# Patient Record
Sex: MALE | Race: BLACK OR AFRICAN AMERICAN | Employment: OTHER | URBAN - METROPOLITAN AREA
[De-identification: names, ages, dates, MRNs, and addresses within clinical notes are randomized per-mention and may not be internally consistent; named-entity substitution may affect disease eponyms.]

---

## 2019-02-14 ENCOUNTER — HOSPITAL ENCOUNTER (INPATIENT)
Age: 69
LOS: 5 days | Discharge: HOME HEALTH | DRG: 023 | End: 2019-02-19
Attending: NEUROLOGICAL SURGERY | Admitting: NEUROLOGICAL SURGERY
Payer: MEDICARE

## 2019-02-14 ENCOUNTER — APPOINTMENT (OUTPATIENT)
Dept: INTERVENTIONAL RADIOLOGY/VASCULAR | Age: 69
DRG: 023 | End: 2019-02-14
Attending: NURSE PRACTITIONER
Payer: MEDICARE

## 2019-02-14 ENCOUNTER — APPOINTMENT (OUTPATIENT)
Dept: CT IMAGING | Age: 69
DRG: 023 | End: 2019-02-14
Attending: NURSE PRACTITIONER
Payer: MEDICARE

## 2019-02-14 DIAGNOSIS — E11.9 CONTROLLED TYPE 2 DIABETES MELLITUS WITHOUT COMPLICATION, WITH LONG-TERM CURRENT USE OF INSULIN (HCC): Chronic | ICD-10-CM

## 2019-02-14 DIAGNOSIS — I63.511 CEREBRAL INFARCTION DUE TO OCCLUSION OF RIGHT MIDDLE CEREBRAL ARTERY (HCC): ICD-10-CM

## 2019-02-14 DIAGNOSIS — I63.511 ACUTE RIGHT ARTERIAL ISCHEMIC STROKE, MCA (MIDDLE CEREBRAL ARTERY) (HCC): ICD-10-CM

## 2019-02-14 DIAGNOSIS — I48.0 PAROXYSMAL ATRIAL FIBRILLATION (HCC): ICD-10-CM

## 2019-02-14 DIAGNOSIS — I10 ESSENTIAL HYPERTENSION: ICD-10-CM

## 2019-02-14 DIAGNOSIS — Z79.4 CONTROLLED TYPE 2 DIABETES MELLITUS WITHOUT COMPLICATION, WITH LONG-TERM CURRENT USE OF INSULIN (HCC): Chronic | ICD-10-CM

## 2019-02-14 DIAGNOSIS — I63.9 ISCHEMIC STROKE (HCC): Primary | ICD-10-CM

## 2019-02-14 DIAGNOSIS — I61.0 NONTRAUMATIC SUBCORTICAL HEMORRHAGE OF RIGHT CEREBRAL HEMISPHERE (HCC): ICD-10-CM

## 2019-02-14 PROCEDURE — 70496 CT ANGIOGRAPHY HEAD: CPT

## 2019-02-14 PROCEDURE — C1760 CLOSURE DEV, VASC: HCPCS

## 2019-02-14 PROCEDURE — C1894 INTRO/SHEATH, NON-LASER: HCPCS

## 2019-02-14 PROCEDURE — C1769 GUIDE WIRE: HCPCS

## 2019-02-14 PROCEDURE — 74011250636 HC RX REV CODE- 250/636: Performed by: NEUROLOGICAL SURGERY

## 2019-02-14 PROCEDURE — 77030019605

## 2019-02-14 PROCEDURE — 74011250637 HC RX REV CODE- 250/637: Performed by: NURSE PRACTITIONER

## 2019-02-14 PROCEDURE — B40G1ZZ PLAIN RADIOGRAPHY OF LEFT LOWER EXTREMITY ARTERIES USING LOW OSMOLAR CONTRAST: ICD-10-PCS | Performed by: NEUROLOGICAL SURGERY

## 2019-02-14 PROCEDURE — 61645 PERQ ART M-THROMBECT &/NFS: CPT | Performed by: NEUROLOGICAL SURGERY

## 2019-02-14 PROCEDURE — 65610000001 HC ROOM ICU GENERAL

## 2019-02-14 PROCEDURE — 74011250636 HC RX REV CODE- 250/636

## 2019-02-14 PROCEDURE — 70450 CT HEAD/BRAIN W/O DYE: CPT

## 2019-02-14 PROCEDURE — 74011250636 HC RX REV CODE- 250/636: Performed by: NURSE PRACTITIONER

## 2019-02-14 PROCEDURE — 77030038864 HC CATH NEURO AXS CAT 5 STRY -H1

## 2019-02-14 PROCEDURE — L1830 KO IMMOB CANVAS LONG PRE OTS: HCPCS

## 2019-02-14 PROCEDURE — 77030005518 HC CATH URETH FOL 2W BARD -B

## 2019-02-14 PROCEDURE — 0042T CT PERF W CBF: CPT

## 2019-02-14 PROCEDURE — 77030032490 HC SLV COMPR SCD KNE COVD -B

## 2019-02-14 PROCEDURE — 99153 MOD SED SAME PHYS/QHP EA: CPT

## 2019-02-14 PROCEDURE — B30R1ZZ PLAIN RADIOGRAPHY OF INTRACRANIAL ARTERIES USING LOW OSMOLAR CONTRAST: ICD-10-PCS | Performed by: NEUROLOGICAL SURGERY

## 2019-02-14 PROCEDURE — 03CG3ZZ EXTIRPATION OF MATTER FROM INTRACRANIAL ARTERY, PERCUTANEOUS APPROACH: ICD-10-PCS | Performed by: NEUROLOGICAL SURGERY

## 2019-02-14 PROCEDURE — 74011000250 HC RX REV CODE- 250: Performed by: NEUROLOGICAL SURGERY

## 2019-02-14 PROCEDURE — 93005 ELECTROCARDIOGRAM TRACING: CPT | Performed by: NURSE PRACTITIONER

## 2019-02-14 PROCEDURE — 74011636320 HC RX REV CODE- 636/320: Performed by: NEUROLOGICAL SURGERY

## 2019-02-14 PROCEDURE — B3060ZZ PLAIN RADIOGRAPHY OF RIGHT INTERNAL CAROTID ARTERY USING HIGH OSMOLAR CONTRAST: ICD-10-PCS | Performed by: NEUROLOGICAL SURGERY

## 2019-02-14 PROCEDURE — 77030004635 HC CATH ANGI TORC COOK -B

## 2019-02-14 PROCEDURE — B3031ZZ PLAIN RADIOGRAPHY OF RIGHT COMMON CAROTID ARTERY USING LOW OSMOLAR CONTRAST: ICD-10-PCS | Performed by: NEUROLOGICAL SURGERY

## 2019-02-14 PROCEDURE — 74011000258 HC RX REV CODE- 258: Performed by: NEUROLOGICAL SURGERY

## 2019-02-14 PROCEDURE — 36217 PLACE CATHETER IN ARTERY: CPT | Performed by: NEUROLOGICAL SURGERY

## 2019-02-14 PROCEDURE — 99152 MOD SED SAME PHYS/QHP 5/>YRS: CPT

## 2019-02-14 PROCEDURE — C1887 CATHETER, GUIDING: HCPCS

## 2019-02-14 PROCEDURE — 75810000275 HC EMERGENCY DEPT VISIT NO LEVEL OF CARE: Performed by: NEUROLOGICAL SURGERY

## 2019-02-14 PROCEDURE — C1757 CATH, THROMBECTOMY/EMBOLECT: HCPCS

## 2019-02-14 PROCEDURE — 76937 US GUIDE VASCULAR ACCESS: CPT

## 2019-02-14 RX ORDER — SODIUM CHLORIDE 0.9 % (FLUSH) 0.9 %
10 SYRINGE (ML) INJECTION
Status: COMPLETED | OUTPATIENT
Start: 2019-02-14 | End: 2019-02-14

## 2019-02-14 RX ORDER — SODIUM CHLORIDE 9 MG/ML
25 INJECTION, SOLUTION INTRAVENOUS ONCE
Status: COMPLETED | OUTPATIENT
Start: 2019-02-14 | End: 2019-02-14

## 2019-02-14 RX ORDER — ONDANSETRON 2 MG/ML
8 INJECTION INTRAMUSCULAR; INTRAVENOUS AS NEEDED
Status: DISCONTINUED | OUTPATIENT
Start: 2019-02-14 | End: 2019-02-14

## 2019-02-14 RX ORDER — HYDROCODONE BITARTRATE AND ACETAMINOPHEN 7.5; 325 MG/1; MG/1
1 TABLET ORAL
Status: DISCONTINUED | OUTPATIENT
Start: 2019-02-14 | End: 2019-02-19 | Stop reason: HOSPADM

## 2019-02-14 RX ORDER — ROSUVASTATIN CALCIUM 20 MG/1
40 TABLET, COATED ORAL
Status: DISCONTINUED | OUTPATIENT
Start: 2019-02-14 | End: 2019-02-19 | Stop reason: HOSPADM

## 2019-02-14 RX ORDER — SODIUM CHLORIDE 9 MG/ML
75 INJECTION, SOLUTION INTRAVENOUS CONTINUOUS
Status: DISCONTINUED | OUTPATIENT
Start: 2019-02-14 | End: 2019-02-15

## 2019-02-14 RX ORDER — BISACODYL 5 MG
5 TABLET, DELAYED RELEASE (ENTERIC COATED) ORAL DAILY PRN
Status: DISCONTINUED | OUTPATIENT
Start: 2019-02-14 | End: 2019-02-19 | Stop reason: HOSPADM

## 2019-02-14 RX ORDER — INSULIN LISPRO 100 [IU]/ML
INJECTION, SOLUTION INTRAVENOUS; SUBCUTANEOUS EVERY 6 HOURS
Status: DISCONTINUED | OUTPATIENT
Start: 2019-02-15 | End: 2019-02-16

## 2019-02-14 RX ORDER — FENTANYL CITRATE 50 UG/ML
25-100 INJECTION, SOLUTION INTRAMUSCULAR; INTRAVENOUS
Status: DISCONTINUED | OUTPATIENT
Start: 2019-02-14 | End: 2019-02-14

## 2019-02-14 RX ORDER — HEPARIN SODIUM 200 [USP'U]/100ML
1000 INJECTION, SOLUTION INTRAVENOUS
Status: DISCONTINUED | OUTPATIENT
Start: 2019-02-14 | End: 2019-02-18 | Stop reason: HOSPADM

## 2019-02-14 RX ORDER — MIDAZOLAM HYDROCHLORIDE 1 MG/ML
.25-2 INJECTION, SOLUTION INTRAMUSCULAR; INTRAVENOUS
Status: DISCONTINUED | OUTPATIENT
Start: 2019-02-14 | End: 2019-02-14

## 2019-02-14 RX ORDER — ONDANSETRON 2 MG/ML
4 INJECTION INTRAMUSCULAR; INTRAVENOUS
Status: DISCONTINUED | OUTPATIENT
Start: 2019-02-14 | End: 2019-02-19 | Stop reason: HOSPADM

## 2019-02-14 RX ORDER — ACETAMINOPHEN 650 MG/1
650 SUPPOSITORY RECTAL
Status: DISCONTINUED | OUTPATIENT
Start: 2019-02-14 | End: 2019-02-14

## 2019-02-14 RX ORDER — NICARDIPINE HYDROCHLORIDE 0.1 MG/ML
0-15 INJECTION INTRAVENOUS ONCE
Status: COMPLETED | OUTPATIENT
Start: 2019-02-14 | End: 2019-02-14

## 2019-02-14 RX ORDER — SERTRALINE HYDROCHLORIDE 100 MG/1
100 TABLET, FILM COATED ORAL EVERY EVENING
Status: DISCONTINUED | OUTPATIENT
Start: 2019-02-15 | End: 2019-02-19 | Stop reason: HOSPADM

## 2019-02-14 RX ORDER — IODIXANOL 320 MG/ML
5-300 INJECTION, SOLUTION INTRAVASCULAR
Status: COMPLETED | OUTPATIENT
Start: 2019-02-14 | End: 2019-02-14

## 2019-02-14 RX ORDER — DOCUSATE SODIUM 100 MG/1
100 CAPSULE, LIQUID FILLED ORAL 2 TIMES DAILY
Status: DISCONTINUED | OUTPATIENT
Start: 2019-02-14 | End: 2019-02-19 | Stop reason: HOSPADM

## 2019-02-14 RX ORDER — LISINOPRIL AND HYDROCHLOROTHIAZIDE 12.5; 2 MG/1; MG/1
1 TABLET ORAL DAILY
Status: DISCONTINUED | OUTPATIENT
Start: 2019-02-15 | End: 2019-02-19 | Stop reason: HOSPADM

## 2019-02-14 RX ORDER — ONDANSETRON 2 MG/ML
1 INJECTION INTRAMUSCULAR; INTRAVENOUS
Status: DISCONTINUED | OUTPATIENT
Start: 2019-02-14 | End: 2019-02-14 | Stop reason: SDUPTHER

## 2019-02-14 RX ADMIN — HEPARIN SODIUM 2000 UNITS: 200 INJECTION, SOLUTION INTRAVENOUS at 20:45

## 2019-02-14 RX ADMIN — HEPARIN SODIUM 2000 UNITS: 200 INJECTION, SOLUTION INTRAVENOUS at 20:54

## 2019-02-14 RX ADMIN — Medication 10 ML: at 20:06

## 2019-02-14 RX ADMIN — SODIUM CHLORIDE 75 ML/HR: 900 INJECTION, SOLUTION INTRAVENOUS at 21:56

## 2019-02-14 RX ADMIN — HYDROCODONE BITARTRATE AND ACETAMINOPHEN 1 TABLET: 7.5; 325 TABLET ORAL at 23:11

## 2019-02-14 RX ADMIN — MIDAZOLAM HYDROCHLORIDE 2 MG: 1 INJECTION, SOLUTION INTRAMUSCULAR; INTRAVENOUS at 21:01

## 2019-02-14 RX ADMIN — HEPARIN SODIUM 2000 UNITS: 200 INJECTION, SOLUTION INTRAVENOUS at 20:48

## 2019-02-14 RX ADMIN — FENTANYL CITRATE 25 MCG: 50 INJECTION, SOLUTION INTRAMUSCULAR; INTRAVENOUS at 20:58

## 2019-02-14 RX ADMIN — SODIUM CHLORIDE 100 ML: 900 INJECTION, SOLUTION INTRAVENOUS at 20:06

## 2019-02-14 RX ADMIN — ROSUVASTATIN CALCIUM 40 MG: 20 TABLET, FILM COATED ORAL at 23:11

## 2019-02-14 RX ADMIN — IODIXANOL 100 ML: 320 INJECTION, SOLUTION INTRAVASCULAR at 21:13

## 2019-02-14 RX ADMIN — IOPAMIDOL 125 ML: 755 INJECTION, SOLUTION INTRAVENOUS at 20:06

## 2019-02-14 RX ADMIN — HEPARIN SODIUM 2000 UNITS: 200 INJECTION, SOLUTION INTRAVENOUS at 20:53

## 2019-02-14 RX ADMIN — HEPARIN SODIUM 2000 UNITS: 200 INJECTION, SOLUTION INTRAVENOUS at 20:55

## 2019-02-14 RX ADMIN — DOCUSATE SODIUM 100 MG: 100 CAPSULE, LIQUID FILLED ORAL at 23:11

## 2019-02-14 RX ADMIN — NICARDIPINE HYDROCHLORIDE 5 MG/HR: 0.1 INJECTION, SOLUTION INTRAVENOUS at 21:56

## 2019-02-14 RX ADMIN — SODIUM CHLORIDE 25 ML/HR: 900 INJECTION, SOLUTION INTRAVENOUS at 20:30

## 2019-02-14 RX ADMIN — ONDANSETRON HYDROCHLORIDE 8 MG: 2 INJECTION, SOLUTION INTRAVENOUS at 21:00

## 2019-02-15 ENCOUNTER — APPOINTMENT (OUTPATIENT)
Dept: CT IMAGING | Age: 69
DRG: 023 | End: 2019-02-15
Attending: NURSE PRACTITIONER
Payer: MEDICARE

## 2019-02-15 PROBLEM — I48.0 PAROXYSMAL ATRIAL FIBRILLATION (HCC): Status: ACTIVE | Noted: 2019-02-15

## 2019-02-15 PROBLEM — I61.0 NONTRAUMATIC SUBCORTICAL HEMORRHAGE OF RIGHT CEREBRAL HEMISPHERE (HCC): Status: ACTIVE | Noted: 2019-02-15

## 2019-02-15 LAB
ABO + RH BLD: NORMAL
AMPHET UR QL SCN: NEGATIVE
ANION GAP SERPL CALC-SCNC: 6 MMOL/L (ref 7–16)
APTT PPP: 26.9 SEC (ref 24.7–39.8)
APTT PPP: 26.9 SEC (ref 24.7–39.8)
ATRIAL RATE: 340 BPM
ATRIAL RATE: 394 BPM
BARBITURATES UR QL SCN: NEGATIVE
BENZODIAZ UR QL: POSITIVE
BLOOD GROUP ANTIBODIES SERPL: NORMAL
BUN SERPL-MCNC: 11 MG/DL (ref 8–23)
CALCIUM SERPL-MCNC: 8.2 MG/DL (ref 8.3–10.4)
CALCULATED R AXIS, ECG10: 38 DEGREES
CALCULATED R AXIS, ECG10: 52 DEGREES
CALCULATED T AXIS, ECG11: -150 DEGREES
CALCULATED T AXIS, ECG11: 95 DEGREES
CANNABINOIDS UR QL SCN: NEGATIVE
CHLORIDE SERPL-SCNC: 109 MMOL/L (ref 98–107)
CHOLEST SERPL-MCNC: 223 MG/DL
CK SERPL-CCNC: 98 U/L (ref 21–215)
CO2 SERPL-SCNC: 26 MMOL/L (ref 21–32)
COCAINE UR QL SCN: NEGATIVE
CREAT SERPL-MCNC: 0.75 MG/DL (ref 0.8–1.5)
DIAGNOSIS, 93000: NORMAL
DIAGNOSIS, 93000: NORMAL
ERYTHROCYTE [DISTWIDTH] IN BLOOD BY AUTOMATED COUNT: 14.5 % (ref 11.9–14.6)
ERYTHROCYTE [DISTWIDTH] IN BLOOD BY AUTOMATED COUNT: 14.6 % (ref 11.9–14.6)
EST. AVERAGE GLUCOSE BLD GHB EST-MCNC: 137 MG/DL
GLUCOSE BLD STRIP.AUTO-MCNC: 105 MG/DL (ref 65–100)
GLUCOSE BLD STRIP.AUTO-MCNC: 108 MG/DL (ref 65–100)
GLUCOSE BLD STRIP.AUTO-MCNC: 120 MG/DL (ref 65–100)
GLUCOSE BLD STRIP.AUTO-MCNC: 121 MG/DL (ref 65–100)
GLUCOSE BLD STRIP.AUTO-MCNC: 140 MG/DL (ref 65–100)
GLUCOSE SERPL-MCNC: 118 MG/DL (ref 65–100)
HBA1C MFR BLD: 6.4 % (ref 4.8–6)
HCT VFR BLD AUTO: 46.1 % (ref 41.1–50.3)
HCT VFR BLD AUTO: 47.8 % (ref 41.1–50.3)
HDLC SERPL-MCNC: 55 MG/DL (ref 40–60)
HDLC SERPL: 4.1 {RATIO}
HGB BLD-MCNC: 14.6 G/DL (ref 13.6–17.2)
HGB BLD-MCNC: 15.1 G/DL (ref 13.6–17.2)
INR PPP: 1
LDLC SERPL CALC-MCNC: 152.4 MG/DL
LIPID PROFILE,FLP: ABNORMAL
MAGNESIUM SERPL-MCNC: 2 MG/DL (ref 1.8–2.4)
MAGNESIUM SERPL-MCNC: 2.1 MG/DL (ref 1.8–2.4)
MCH RBC QN AUTO: 26.8 PG (ref 26.1–32.9)
MCH RBC QN AUTO: 27 PG (ref 26.1–32.9)
MCHC RBC AUTO-ENTMCNC: 31.6 G/DL (ref 31.4–35)
MCHC RBC AUTO-ENTMCNC: 31.7 G/DL (ref 31.4–35)
MCV RBC AUTO: 84.6 FL (ref 79.6–97.8)
MCV RBC AUTO: 85.4 FL (ref 79.6–97.8)
METHADONE UR QL: NEGATIVE
NRBC # BLD: 0 K/UL (ref 0–0.2)
NRBC # BLD: 0 K/UL (ref 0–0.2)
OPIATES UR QL: POSITIVE
PCP UR QL: NEGATIVE
PLATELET # BLD AUTO: 135 K/UL (ref 150–450)
PLATELET # BLD AUTO: 167 K/UL (ref 150–450)
PMV BLD AUTO: 10.8 FL (ref 9.4–12.3)
PMV BLD AUTO: 11.7 FL (ref 9.4–12.3)
POTASSIUM SERPL-SCNC: 3.8 MMOL/L (ref 3.5–5.1)
PROTHROMBIN TIME: 13.3 SEC (ref 11.7–14.5)
Q-T INTERVAL, ECG07: 344 MS
Q-T INTERVAL, ECG07: 410 MS
QRS DURATION, ECG06: 66 MS
QRS DURATION, ECG06: 76 MS
QTC CALCULATION (BEZET), ECG08: 427 MS
QTC CALCULATION (BEZET), ECG08: 429 MS
RBC # BLD AUTO: 5.45 M/UL (ref 4.23–5.6)
RBC # BLD AUTO: 5.6 M/UL (ref 4.23–5.6)
SODIUM SERPL-SCNC: 141 MMOL/L (ref 136–145)
SPECIMEN EXP DATE BLD: NORMAL
TRIGL SERPL-MCNC: 78 MG/DL (ref 35–150)
TROPONIN I SERPL-MCNC: 0.3 NG/ML (ref 0.02–0.05)
TROPONIN I SERPL-MCNC: 0.31 NG/ML (ref 0.02–0.05)
VENTRICULAR RATE, ECG03: 66 BPM
VENTRICULAR RATE, ECG03: 93 BPM
VLDLC SERPL CALC-MCNC: 15.6 MG/DL (ref 6–23)
WBC # BLD AUTO: 5.1 K/UL (ref 4.3–11.1)
WBC # BLD AUTO: 5.9 K/UL (ref 4.3–11.1)

## 2019-02-15 PROCEDURE — 74011250636 HC RX REV CODE- 250/636: Performed by: NEUROLOGICAL SURGERY

## 2019-02-15 PROCEDURE — 0042T CT PERF W CBF: CPT

## 2019-02-15 PROCEDURE — 65610000001 HC ROOM ICU GENERAL

## 2019-02-15 PROCEDURE — 70496 CT ANGIOGRAPHY HEAD: CPT

## 2019-02-15 PROCEDURE — 83036 HEMOGLOBIN GLYCOSYLATED A1C: CPT

## 2019-02-15 PROCEDURE — C8929 TTE W OR WO FOL WCON,DOPPLER: HCPCS

## 2019-02-15 PROCEDURE — 80307 DRUG TEST PRSMV CHEM ANLYZR: CPT

## 2019-02-15 PROCEDURE — 74011250637 HC RX REV CODE- 250/637: Performed by: NEUROLOGICAL SURGERY

## 2019-02-15 PROCEDURE — 80061 LIPID PANEL: CPT

## 2019-02-15 PROCEDURE — 93005 ELECTROCARDIOGRAM TRACING: CPT | Performed by: NURSE PRACTITIONER

## 2019-02-15 PROCEDURE — 36415 COLL VENOUS BLD VENIPUNCTURE: CPT

## 2019-02-15 PROCEDURE — 83735 ASSAY OF MAGNESIUM: CPT

## 2019-02-15 PROCEDURE — 77030020263 HC SOL INJ SOD CL0.9% LFCR 1000ML

## 2019-02-15 PROCEDURE — 86900 BLOOD TYPING SEROLOGIC ABO: CPT

## 2019-02-15 PROCEDURE — 85610 PROTHROMBIN TIME: CPT

## 2019-02-15 PROCEDURE — 74011250636 HC RX REV CODE- 250/636: Performed by: NURSE PRACTITIONER

## 2019-02-15 PROCEDURE — 82550 ASSAY OF CK (CPK): CPT

## 2019-02-15 PROCEDURE — 97161 PT EVAL LOW COMPLEX 20 MIN: CPT

## 2019-02-15 PROCEDURE — 85730 THROMBOPLASTIN TIME PARTIAL: CPT

## 2019-02-15 PROCEDURE — 97530 THERAPEUTIC ACTIVITIES: CPT

## 2019-02-15 PROCEDURE — 84484 ASSAY OF TROPONIN QUANT: CPT

## 2019-02-15 PROCEDURE — 70450 CT HEAD/BRAIN W/O DYE: CPT

## 2019-02-15 PROCEDURE — 74011250637 HC RX REV CODE- 250/637: Performed by: NURSE PRACTITIONER

## 2019-02-15 PROCEDURE — 77030021907 HC KT URIN FOL O&M -A

## 2019-02-15 PROCEDURE — 77030018798 HC PMP KT ENTRL FED COVD -A

## 2019-02-15 PROCEDURE — 97166 OT EVAL MOD COMPLEX 45 MIN: CPT

## 2019-02-15 PROCEDURE — 99221 1ST HOSP IP/OBS SF/LOW 40: CPT | Performed by: PHYSICAL MEDICINE & REHABILITATION

## 2019-02-15 PROCEDURE — 99232 SBSQ HOSP IP/OBS MODERATE 35: CPT | Performed by: NEUROLOGICAL SURGERY

## 2019-02-15 PROCEDURE — 85027 COMPLETE CBC AUTOMATED: CPT

## 2019-02-15 PROCEDURE — 74011000258 HC RX REV CODE- 258: Performed by: NEUROLOGICAL SURGERY

## 2019-02-15 PROCEDURE — 74011636320 HC RX REV CODE- 636/320: Performed by: NEUROLOGICAL SURGERY

## 2019-02-15 PROCEDURE — 92610 EVALUATE SWALLOWING FUNCTION: CPT

## 2019-02-15 PROCEDURE — 74011000250 HC RX REV CODE- 250: Performed by: NEUROLOGICAL SURGERY

## 2019-02-15 PROCEDURE — 80048 BASIC METABOLIC PNL TOTAL CA: CPT

## 2019-02-15 PROCEDURE — 82962 GLUCOSE BLOOD TEST: CPT

## 2019-02-15 RX ORDER — GUAIFENESIN 100 MG/5ML
81 LIQUID (ML) ORAL DAILY
Status: DISCONTINUED | OUTPATIENT
Start: 2019-02-15 | End: 2019-02-18

## 2019-02-15 RX ORDER — SODIUM CHLORIDE 0.9 % (FLUSH) 0.9 %
10 SYRINGE (ML) INJECTION
Status: COMPLETED | OUTPATIENT
Start: 2019-02-15 | End: 2019-02-15

## 2019-02-15 RX ORDER — ENOXAPARIN SODIUM 100 MG/ML
40 INJECTION SUBCUTANEOUS EVERY 24 HOURS
Status: DISCONTINUED | OUTPATIENT
Start: 2019-02-15 | End: 2019-02-16 | Stop reason: ALTCHOICE

## 2019-02-15 RX ADMIN — DOCUSATE SODIUM 100 MG: 100 CAPSULE, LIQUID FILLED ORAL at 18:14

## 2019-02-15 RX ADMIN — SODIUM CHLORIDE 100 ML: 900 INJECTION, SOLUTION INTRAVENOUS at 00:45

## 2019-02-15 RX ADMIN — LISINOPRIL AND HYDROCHLOROTHIAZIDE 1 TABLET: 12.5; 2 TABLET ORAL at 10:36

## 2019-02-15 RX ADMIN — ASPIRIN 81 MG 81 MG: 81 TABLET ORAL at 15:54

## 2019-02-15 RX ADMIN — Medication 10 ML: at 00:45

## 2019-02-15 RX ADMIN — SERTRALINE 100 MG: 100 TABLET, FILM COATED ORAL at 18:15

## 2019-02-15 RX ADMIN — IOPAMIDOL 125 ML: 755 INJECTION, SOLUTION INTRAVENOUS at 00:45

## 2019-02-15 RX ADMIN — PERFLUTREN 1 ML: 6.52 INJECTION, SUSPENSION INTRAVENOUS at 08:00

## 2019-02-15 RX ADMIN — SODIUM CHLORIDE 75 ML/HR: 900 INJECTION, SOLUTION INTRAVENOUS at 06:40

## 2019-02-15 RX ADMIN — ROSUVASTATIN CALCIUM 40 MG: 20 TABLET, FILM COATED ORAL at 22:12

## 2019-02-15 RX ADMIN — DOCUSATE SODIUM 100 MG: 100 CAPSULE, LIQUID FILLED ORAL at 10:37

## 2019-02-15 RX ADMIN — ENOXAPARIN SODIUM 40 MG: 40 INJECTION SUBCUTANEOUS at 15:54

## 2019-02-15 NOTE — ED NOTES
Shannan Beatty in ICU given repot from this RN. Information given was brief due to patients status. Patient was taken to CT and IR post registration in ED.

## 2019-02-15 NOTE — PROGRESS NOTES
LTG: Patient will tolerate least restrictive diet without overt signs or symptoms of airway compromise. STG: Patient will tolerate mechanical soft diet and thin liquids without overt signs or symptoms of airway compromise. STG: Patient will participate in modified barium swallow study as clinically indicated. Speech language pathology: bedside swallow note: Initial Assessment NAME/AGE/GENDER: Corinne Carson is a 76 y.o. male DATE: 2/15/2019 PRIMARY DIAGNOSIS: Acute right arterial ischemic stroke, MCA (middle cerebral artery) ([facility] [address] [I63.511] ICD-10: Treatment Diagnosis: R13.11 Oral Dysphagia. INTERDISCIPLINARY COLLABORATION: Registered Nurse and Physician PRECAUTIONS/ALLERGIES: Niacin ASSESSMENT:Based on the objective data described below, Mr. Camelia Ortiz presents with left facial droop and left inattention. Decreased sustained attention to clinician during evaluation and delayed verbal responses. Mild dysarthria noted, but patient and wife feel speech is at baseline. Patient presented with thin liquid via tsp, cup, and straw, puree, and mixed consistency. Prolonged mastication with chewable textures, which patient attributes to poor dentition/oral discomfort. No left sided pocketing observed, but increased risk due to left facial droop and decreased attention. Thin liquids tolerated without overt difficulty. Recommend mechanical soft diet/thin liquids. Medications whole in puree. Speech to follow for diet tolerance and po trials for possible advancement. Will also benefit from speech-language/cognitive assessment. Anticipated need for continued speech therapy services. Patient will benefit from skilled intervention to address the below impairments. ?????? ? ? This section established at most recent assessment?????????? 
PROBLEM LIST (Impairments causing functional limitations): 1. Oral dysphagia REHABILITATION POTENTIAL FOR STATED GOALS: Good PLAN OF CARE:  
 Patient will benefit from skilled intervention to address the following impairments. RECOMMENDATIONS AND PLANNED INTERVENTIONS (Benefits and precautions of therapy have been discussed with the patient.): 
· PO:  Mechanical soft · Liquids:  regular thin MEDICATIONS: 
· With liquid ASPIRATION PRECAUTIONS: 
· Slow rate of intake · Small bites/sips · Upright at 90 degrees during meal 
COMPENSATORY STRATEGIES/MODIFICATIONS INCLUDING: 
· Alternate liquids/solids · Small sips and bites OTHER RECOMMENDATIONS (including follow up treatment recommendations): · Family training/education · Patient education RECOMMENDED DIET MODIFICATIONS DISCUSSED WITH: 
· Medical Sub-Specialist 
· Nursing · Patient FREQUENCY/DURATION: Continue to follow patient 3 times a week for duration of hospital stay to address above goals. RECOMMENDED REHABILITATION/EQUIPMENT: (at time of discharge pending progress): Due to the probability of continued deficits (see above) this patient will likely need continued skilled speech therapy after discharge. SUBJECTIVE:  
Alert, but delayed responses. Wife at bedside. History of Present Injury/Illness: Mr. Tamy George  has no past medical history on file. Noble Jose Alfredo He also  has no past surgical history on file. Present Symptoms: oral dysphagia Pain Scale 1: Numeric (0 - 10) Pain Intensity 1: 0 Pain Location 1: Head 
Pain Intervention(s) 1: Medication (see MAR) Current Medications: No current facility-administered medications on file prior to encounter. No current outpatient medications on file prior to encounter. Current Dietary Status: NPO OBJECTIVE:  
Respiratory Status:  Nasal cannula  2 l/min Oral Motor Structure/Speech:  Oral-Motor Structure/Motor Speech Labial: Decreased rate, Left droop Dentition: Intact Lingual: Decreased rate, Decreased strength Cognitive and Communication Status: 
Neurologic State: Alert Orientation Level: Oriented X4 
 Cognition: Follows commands Perception: Cues to attend right visual field Perseveration: No perseveration noted Safety/Judgement: Fall prevention;Decreased insight into deficits BEDSIDE SWALLOW EVALUATIONOral Assessment: 
Oral Assessment Labial: Decreased rate;Left droop Dentition: Intact Lingual: Decreased rate;Decreased strength P.O. Trials: 
Patient Position: Upright in chair The patient was given tsp-small bite amounts of the following:  
Consistency Presented: Thin liquid;Puree;Mixed consistency; Solid How Presented: SLP-fed/presented;Cup/sip;Spoon;Straw;Successive swallows; Self-fed/presented ORAL PHASE: 
Bolus Acceptance: No impairment Bolus Formation/Control: Impaired Propulsion: Delayed (# of seconds) Type of Impairment: Mastication Oral Residue: None PHARYNGEAL PHASE: 
Initiation of Swallow: No impairment Laryngeal Elevation: Functional 
Aspiration Signs/Symptoms: None Vocal Quality: No impairment(Dysarthric) Effective Modifications: None Pharyngeal Phase Characteristics: No impairment, issues, or problems OTHER OBSERVATIONS: 
Rate/bite size: Questionable Endurance:  Questionable Comments: Tool Used: Dysphagia Outcome and Severity Scale (FABIAN) Score Comments Normal Diet  [] 7 With no strategies or extra time needed Functional Swallow  [] 6 May have mild oral or pharyngeal delay Mild Dysphagia [x] 5 Which may require one diet consistency restricted (those who demonstrate penetration which is entirely cleared on MBS would be included) Mild-Moderate Dysphagia  [] 4 With 1-2 diet consistencies restricted Moderate Dysphagia  [] 3 With 2 or more diet consistencies restricted Moderately Severe Dysphagia  [] 2 With partial PO strategies (trials with ST only) Severe Dysphagia  [] 1 With inability to tolerate any PO safely Score:  Initial: 5 Most Recent: X (Date: --) Interpretation of Tool: The Dysphagia Outcome and Severity Scale (FABIAN) is a simple, easy-to-use, 7-point scale developed to systematically rate the functional severity of dysphagia based on objective assessment and make recommendations for diet level, independence level, and type of nutrition. Payor: /  
 
TREATMENT:  
 (In addition to Assessment/Re-Assessment sessions the following treatments were rendered) Assessment/Reassessment only, no treatment provided today MODALITIES:  
  
  
  
  
  
  
  
  
  
  
    
  
  
  
  
  
  
  
  
   
 
ORAL MOTOR  EXERCISES: 
  
  
  
  
  
  
  
  
  
  
  
  
  
  
  
  
  
  
  
  
  
  
  
  
  
  
    
  
  
  
  
  
  
  
  
  
  
  
  
  
  
  
  
  
  
  
  
  
  
  
  
  
   
 
LARYNGEAL / PHARYNGEAL EXERCISES: 
  
  
  
  
  
  
  
  
  
  
  
  
  
  
  
  
  
  
  
  
  
    
  
  
  
  
  
  
  
  
  
  
  
  
  
  
  
  
  
  
  
   
 
__________________________________________________________________________________________________ Safety: After treatment position/precautions: · Up in chair · RN notified · Family at bedside. Progression/Medical Necessity:  
· Patient is expected to demonstrate progress in swallow strength, swallow timeliness, swallow function, diet tolerance and swallow safety to improve swallow safety, work toward diet advancement and decrease aspiration risk. Compliance with Program/Exercises: Will assess as treatment progresses Reason for Continuation of Services/Other Comments: 
· Patient continues to require skilled intervention due to oral dysphagia. Recommendations/Intent for next treatment session: \"Treatment next visit will focus on diet tolerance, po trials for potential upgrade, cognitive-linguistic assessment\". Total Treatment Duration: 
Time In: 0907 Time Out: 1016 [address] CCC-SLP 
 
 
 
 Statement Selected

## 2019-02-15 NOTE — PROGRESS NOTES
Problem: Mobility Impaired (Adult and Pediatric) Goal: *Acute Goals and Plan of Care (Insert Text) STG: 
(1.)Mr. Yoselyn Morillo will move from supine to sit and sit to supine , scoot up and down and roll side to side with STAND BY ASSIST within 3 treatment day(s). (2.)Mr. Yoselyn Morillo will transfer from bed to chair and chair to bed with STAND BY ASSIST using the least restrictive device within 3 treatment day(s). (3.)Mr. Yoselyn Morillo will ambulate with CONTACT GUARD ASSIST for 50 feet with the least restrictive device within 3 treatment day(s). (4.)Mr. Yoselyn Morillo will perform standing static and dynamic balance activities x 15 minutes with CONTACT GUARD ASSIST to improve safety within 3 day(s). (5.)Mr. Yoselyn Morillo will perform LE exercises with 1 to 2 cues for form within 3 days to improve strength for functional transfers and ambulation. LTG: 
(1.)Mr. Yoselyn Morillo will move from supine to sit and sit to supine , scoot up and down and roll side to side in bed with SUPERVISION within 7 treatment day(s). (2.)Mr. Yoselyn Morillo will transfer from bed to chair and chair to bed with STAND BY ASSIST using the least restrictive device within 7 treatment day(s). (3.)Mr. Yoselyn Morillo will ambulate with STAND BY ASSIST for 100 feet with the least restrictive device within 7 treatment day(s). (4.)Mr. Yoselyn Morillo will perform standing static and dynamic balance activities x 15 minutes with STAND BY ASSIST to improve safety within 7 day(s). ________________________________________________________________________________________________ PHYSICAL THERAPY: Initial Assessment and PM 2/15/2019INPATIENT:   
Payor: Saniya Alegria / Plan: [address] / Product Type: Managed Care Medicare /   
  
NAME/AGE/GENDER: Blayne Osborne is a 76 y.o. male PRIMARY DIAGNOSIS: Acute right arterial ischemic stroke, MCA (middle cerebral artery) ([facility] [address] [I63.511] Cerebral infarction due to occlusion of right middle cerebral artery (HCC) Cerebral infarction due to occlusion of right middle cerebral artery ([facility] [address] ICD-10: Treatment Diagnosis: · Difficulty in walking, Not elsewhere classified (R26.2) Precaution/Allergies: 
Niacin ASSESSMENT:  
Mr. Jamal Bermeo is a 76 y.o. male admitted for acute R arterial ischemic MCA CVA and s/p mechanical thrombectomy. He lives alone (or with a friend per niece). He typically ambulates and performs ADLs independently to modified independently with a cane. He is quite drowsy on contact, but agreeable to therapy. Noted decreased attention to L visual field and continued L sided weakness (grossly 3+/5). He transferred to edge of bed with minimal assistance and stood with contact guard assist. Somewhat impulsive and attempted to ambulate away from bed without instruction despite being connected to ICU monitors/catheter. Able to side step at edge of bed with CGA and rolling walker and returned to supine with CGA. Wife entered room near the end of the session, seems somewhat unaware of patient's deficits. He is well below his baseline at this time and with poor insight into his deficits. Clifton Jason is currently functioning below his baseline and would benefit from skilled PT during acute care stay to maximize safety and independence with functional mobility. This section established at most recent assessment PROBLEM LIST (Impairments causing functional limitations): 1. Decreased Strength 2. Decreased ADL/Functional Activities 3. Decreased Transfer Abilities 4. Decreased Ambulation Ability/Technique 5. Decreased Balance 6. Decreased Activity Tolerance 7. Decreased Pacing Skills 8. Increased Shortness of Breath 9. Decreased Knowledge of Precautions 10. Decreased [place] with Home Exercise Program 
11. Decreased Cognition INTERVENTIONS PLANNED: (Benefits and precautions of physical therapy have been discussed with the patient.) 1. Balance Exercise 2. Bed Mobility 3. Family Education 4. Gait Training 5. Group Therapy 6. Home Exercise Program (HEP) 7. Therapeutic Activites 8. Therapeutic Exercise/Strengthening 9. Transfer Training 10. Patient Education TREATMENT PLAN: Frequency/Duration: 3 times a week for duration of hospital stay Rehabilitation Potential For Stated Goals: Good RECOMMENDED REHABILITATION/EQUIPMENT: (at time of discharge pending progress): Due to the probability of continued deficits (see above) this patient will likely need continued skilled physical therapy after discharge. Equipment:  
? None at this time HISTORY:  
History of Present Injury/Illness (Reason for Referral): 
Clifton Jason is a 76 y.o. R hand dominant male who who originally presented to [place] [address] ED with acute onset of left hemiparesis and slurred speech witnessed by friend around 0 today. Pt complained of R sided headache prior to event. Pt on Xarelto for known LLE DVT per report. Pt with hx of of HTN, DM and depression and PCP is [facility] in [place], [place]. Pt was not candidate for tPa due to current use of Xarelto for LLE DVT. Pt had CT head at [facility], no acute infarct noted or ICH. Pt transferred to [facility] for further evaluation for Endovascular intervention. Pt had STAT CTA/CTP of head and neck upon arrival and found to have R MCA occlusion and was taken emergently for SUZIE with Dr. Caryle Bellow. Pt airway patent, he would follow commands, he had obvious left facial droop, LUE drift, and R gaze preference. NIHSS of 7. Past Medical History/Comorbidities:  
Mr. Jamal Bermeo  has no past medical history on file. Mr. Jamal Bermeo  has a past surgical history that includes ir perc art [facility] thromb infusion intracranial (2/14/2019). Social History/Living Environment:  
Home Environment: Private residence One/Two Story Residence: One story Living Alone: Yes Support Systems: Family member(s) Patient Expects to be Discharged to[de-identified] Private residence Current DME Used/Available at Home: Cane, straight Tub or Shower Type: Tub/Shower combination Prior Level of Function/Work/Activity: 
He lives alone (or with a friend per niece). He typically ambulates and performs ADLs independently to modified independently with a cane. Number of Personal Factors/Comorbidities that affect the Plan of Care: 3+: HIGH COMPLEXITY EXAMINATION:  
Most Recent Physical Functioning:  
Gross Assessment: 
AROM: Generally decreased, functional 
PROM: Within functional limits Strength: Generally decreased, functional 
Coordination: Generally decreased, functional 
Tone: Normal 
Sensation: Intact Posture: 
Posture (WDL): Exceptions to [facility] Posture Assessment: Forward head Balance: 
Sitting: Impaired Sitting - Static: Good (unsupported) Sitting - Dynamic: Fair (occasional) Standing: Impaired Standing - Static: Fair Standing - Dynamic : Fair Bed Mobility: 
Supine to Sit: Minimum assistance Sit to Supine: Contact guard assistance Scooting: Contact guard assistance Wheelchair Mobility: 
  
Transfers: 
Sit to Stand: Contact guard assistance Stand to Sit: Contact guard assistance Gait: 
  
Base of Support: Center of gravity altered; Widened Speed/Jaye: Slow;Shuffled Gait Abnormalities: Decreased step clearance; Path deviations;Trunk sway increased Distance (ft): 3 Feet (ft)(side steps at edge of bed) Assistive Device: Walker, rolling Ambulation - Level of Assistance: Contact guard assistance Interventions: Manual cues; Safety awareness training;Verbal cues Body Structures Involved: 1. Nerves 2. Muscles Body Functions Affected: 1. Sensory/Pain 2. Neuromusculoskeletal 
3. Movement Related Activities and Participation Affected: 1. Mobility 2. Self Care 3. Domestic Life 4. Interpersonal Interactions and Relationships 5. Community, Social and [place] Number of elements that affect the Plan of Care: 4+: HIGH COMPLEXITY CLINICAL PRESENTATION:  
 Presentation: Stable and uncomplicated: LOW COMPLEXITY CLINICAL DECISION MAKIN[address] [place] [address] AM-PAC 6 Clicks Basic Mobility Inpatient Short Form How much difficulty does the patient currently have. .. Unable A Lot A Little None 1. Turning over in bed (including adjusting bedclothes, sheets and blankets)? [] 1   [] 2   [x] 3   [] 4  
2. Sitting down on and standing up from a chair with arms ( e.g., wheelchair, bedside commode, etc.)   [] 1   [] 2   [x] 3   [] 4  
3. Moving from lying on back to sitting on the side of the bed? [] 1   [] 2   [x] 3   [] 4 How much help from another person does the patient currently need. .. Total A Lot A Little None 4. Moving to and from a bed to a chair (including a wheelchair)? [] 1   [] 2   [x] 3   [] 4  
5. Need to walk in hospital room? [] 1   [x] 2   [] 3   [] 4  
6. Climbing 3-5 steps with a railing? [x] 1   [] 2   [] 3   [] 4  
© , Trustees of [address] [place] [address], under license to [employer]. All rights reserved Score:  Initial: 15 Most Recent: X (Date: -- ) Interpretation of Tool:  Represents activities that are increasingly more difficult (i.e. Bed mobility, Transfers, Gait). Medical Necessity:    
· Patient demonstrates good rehab potential due to higher previous functional level. Reason for Services/Other Comments: 
· Patient continues to require modification of therapeutic interventions to increase complexity of exercises. Use of outcome tool(s) and clinical judgement create a POC that gives a: Clear prediction of patient's progress: LOW COMPLEXITY  
  
 
 
 
TREATMENT:  
(In addition to Assessment/Re-Assessment sessions the following treatments were rendered) Pre-treatment Symptoms/Complaints:  none Pain: Initial:  
Pain Intensity 1: 0  Post Session:  010 Assessment/Reassessment only, no treatment provided today Braces/Orthotics/Lines/Etc:  
· IV 
· carson catheter · ICU Monitors · O2 Device: Nasal cannula Treatment/Session Assessment:   
· Response to Treatment:  Patient tolerated well with drowsiness. · Interdisciplinary Collaboration:  
o Physical Therapist 
o Registered Nurse · After treatment position/precautions:  
o Supine in bed 
o Bed/Chair-wheels locked 
o Bed in low position 
o Call light within reach 
o RN notified 
o Family at bedside 
o Nurse at bedside · Compliance with Program/Exercises: Will assess as treatment progresses · Recommendations/Intent for next treatment session: \"Next visit will focus on advancements to more challenging activities and reduction in assistance provided\". Total Treatment Duration: PT Patient Time In/Time Out Time In: 1320 Time Out: 1341 Matthew Vanessa DPT

## 2019-02-15 NOTE — PROGRESS NOTES
Pt seen in ICU s/p admission acute right arterial ischemic stroke, MCA pt of Dr. Ronnie groves from [place]. Pt currently alert and oriented with delayed response. Wife and niece at bedside. Confirms demographics and correction of address (called to business office). There is some question about HCPOA, per staff, niece, Chi Cooney, may be HCPOA. When ask pt, who is alert and oriented, he states he wants his wife to be decision maker. Niece would need to present paperwork to be on file, pt can redo HCPOA with  if alert and oriented x4. If no paperwork, wife is legal decision maker if pt can not make decision himself. At this time, staff states, pt is decisional. Niece does have pt's wallet in room as she showed pt's insurance card. CM to follow for d/c needs of HH vs STR, IRC/9th floor. Dr. Ebony Dc following pt. Care Management Interventions PCP Verified by CM: Angelo Dupree in [facility]) Mode of Transport at Discharge: Other (see comment) Transition of Care Consult (CM Consult): Discharge Planning Discharge Durable Medical Equipment: (cane, glucometer) Current Support Network: Other(lives with friend, Jason Hamm. Wife, Hung Navarro, and niece , Jalaine Ormond -[phone]) Confirm Follow Up Transport: Self(driving self prior) Plan discussed with Pt/Family/Caregiver: Yes Freedom of Choice Offered: Yes  Resource Information Provided?: (confirms [facility]- able to get rx) Discharge Location Discharge Placement: Unable to determine at this time

## 2019-02-15 NOTE — PROGRESS NOTES
Progress Note Patient: Corinne Carson MRN: [id number]  SSN: [id number] YOB: 1950  Age: 76 y.o. Sex: male Admit Date: 2/14/2019 LOS: 1 day Subjective:  
Pt is alert and following commands this am. Pt with LUE drift, but improved, left facial palsy still present, but improved. Pt passed swallow and PT/OT/ST and rehab have seen pt this am. In nad. R groin CDI. Current Facility-Administered Medications Medication Dose Route Frequency  enoxaparin (LOVENOX) injection 40 mg  40 mg SubCUTAneous Q24H  
 aspirin chewable tablet 81 mg  81 mg Oral DAILY  tuberculin injection 5 Units  5 Units IntraDERMal ONCE  
 docusate sodium (COLACE) capsule 100 mg  100 mg Oral BID  
 bisacodyl (DULCOLAX) tablet 5 mg  5 mg Oral DAILY PRN  
 heparin (PF) 2 units/ml in NS infusion 2,000 Units  1,000 mL Irrigation Multiple  HYDROcodone-acetaminophen (NORCO) 7.5-325 mg per tablet 1 Tab  1 Tab Oral Q4H PRN  
 ondansetron (ZOFRAN) injection 4 mg  4 mg IntraVENous Q4H PRN  
 lisinopril-hydroCHLOROthiazide (PRINZIDE, ZESTORETIC) 20-12.5 mg per tablet 1 Tab  1 Tab Oral DAILY  rosuvastatin (CRESTOR) tablet 40 mg  40 mg Oral QHS  insulin lispro (HUMALOG) injection   SubCUTAneous Q6H  
 sertraline (ZOLOFT) tablet 100 mg  100 mg Oral QPM  
 
 
Objective:  
 
Vitals:  
 02/15/19 1048 02/15/19 1056 02/15/19 1100 02/15/19 1131 BP: 159/87  151/82 (!) 167/96 Pulse: 69  63 70 Resp: 18  16 18 Temp:  98.6 °F (37 °C) SpO2: 95%  96% 96% Weight:      
Height:      
  
  
Intake and Output: 
Current Shift: 02/15 0701 - 02/15 1900 In: -  
Out: 275 [Urine:275] Last 24 hr: 02/14 0701 - 02/15 0700 In: 433 [I.V.:433] Out: 8619 [GVJDK:9413] Physical Exam:  
General:  Alert, cooperative, no distress. Eyes:   PERRL, EOMs intact. , mild R gaze but crosses midline.    
Neck: Supple, symmetrical, trachea midline, no adenopathy, thyroid: no enlargment/tenderness/nodules, no carotid bruit and no JVD. Lungs:   Clear to auscultation bilaterally. Heart:  Irregular, afib. No MGR Abdomen:   Soft, non-tender. Bowel sounds normal. No masses,  No organomegaly. Extremities: Extremities normal, atraumatic, no cyanosis or edema. Pulses: 2+ and symmetric all extremities. Skin: Skin color, texture, turgor normal. No rashes or lesions Neurologic: Pt aox3, follows commands. LUE weakness compared to R, but intact. Airway patent. R gaze preference remains, but has improved. Left facial droop improving. Speech clear. Lab/Data Review: 
 
Recent Results (from the past 12 hour(s)) GLUCOSE, POC Collection Time: 02/15/19  1:34 AM  
Result Value Ref Range Glucose (POC) 105 (H) 65 - 100 mg/dL CBC W/O DIFF Collection Time: 02/15/19  4:20 AM  
Result Value Ref Range WBC 5.9 4.3 - 11.1 K/uL  
 RBC 5.45 4.23 - 5.6 M/uL  
 HGB 14.6 13.6 - 17.2 g/dL HCT 46.1 41.1 - 50.3 % MCV 84.6 79.6 - 97.8 FL  
 MCH 26.8 26.1 - 32.9 PG  
 MCHC 31.7 31.4 - 35.0 g/dL  
 RDW 14.6 11.9 - 14.6 % PLATELET 179 (L) 238 - 450 K/uL MPV 11.7 9.4 - 12.3 FL ABSOLUTE NRBC 0.00 0.0 - 0.2 K/uL PTT Collection Time: 02/15/19  4:20 AM  
Result Value Ref Range aPTT 26.9 24.7 - 39.8 SEC MAGNESIUM Collection Time: 02/15/19  4:20 AM  
Result Value Ref Range Magnesium 2.1 1.8 - 2.4 mg/dL TROPONIN I Collection Time: 02/15/19  4:20 AM  
Result Value Ref Range Troponin-I, Qt. 0.30 (HH) 0.02 - 0.05 NG/ML  
DRUG SCREEN, URINE Collection Time: 02/15/19  6:05 AM  
Result Value Ref Range PCP(PHENCYCLIDINE) NEGATIVE BENZODIAZEPINES POSITIVE    
 COCAINE NEGATIVE AMPHETAMINES NEGATIVE METHADONE NEGATIVE     
 THC (TH-CANNABINOL) NEGATIVE     
 OPIATES POSITIVE    
 BARBITURATES NEGATIVE     
GLUCOSE, POC Collection Time: 02/15/19  6:11 AM  
Result Value Ref Range Glucose (POC) 108 (H) 65 - 100 mg/dL EKG, 12 LEAD, SUBSEQUENT  
 Collection Time: 02/15/19  6:57 AM  
Result Value Ref Range Ventricular Rate 66 BPM  
 Atrial Rate 394 BPM  
 QRS Duration 66 ms  
 Q-T Interval 410 ms QTC Calculation (Bezet) 429 ms Calculated R Axis 52 degrees Calculated T Axis -150 degrees Diagnosis Atrial fibrillation ST & T wave abnormality, consider inferolateral ischemia Abnormal ECG When compared with ECG of 14-FEB-2019 22:28, 
ST now depressed in Inferior leads Inverted T waves have replaced nonspecific T wave abnormality in Inferior  
leads Confirmed by SONAL CORONADO (), Candis Oden ([id number]) on 2/15/2019 7:44:25 AM 
  
GLUCOSE, POC Collection Time: 02/15/19 11:30 AM  
Result Value Ref Range Glucose (POC) 121 (H) 65 - 100 mg/dL Assessment/ Plan:  
 
Principal Problem: 
  Cerebral infarction due to occlusion of right middle cerebral artery ([address] (2/14/2019) Active Problems: 
  Essential hypertension (2/14/2019) NEURO: Post R MCA thrombectomy 2-14-19, pt NIHSS this am 3 for gaze, lue drift, facial droop. On Crestor and ASA. CT head this am which I reviewed showed blood in the right extreme capsule/BG area and some petechial changes in the outer temporal cortex. Will hold Xaralto until this blood has cleared. PT/OT/Speech and rehab consulted. Will continue neuro checks today and repeat CT head in am due to blood. RESP: pt tolerating room air. No distress. CV: SBP goal 120-180, cardene gtt prn, 2D echo: shows EF 50-55%, hypokinesis of inferior-septal and apical walls with possible apical thrombus, discussed with Dr. Kimberly Ordoñez. Will cont monitor. Trop peaked at 0.31, trending back down. Pt denies any CP/SOB. Asa 81mg daily. Will hold Xarelto for now due to blood on CT head. HEME: HH: 14/46, Plt 135. SCDs and Lovenox for DVT prophylaxis. NEPH: Bun/Cr:11/0.7 GI:passed Swallow with ST this am, advance diet as tolerated. Pepcid. ID: afebrile, no abx. LINES: carson, IV x2. DISPO: Will send to floor in am if stable and dispo for rehab. Will monitor for any CP. LDL: 152: Pt was on crestor 40mg prior to admit. Will continue 40mg daily. Signed By: Ligia Bee NP February 15, 2019

## 2019-02-15 NOTE — PROGRESS NOTES
Upon neuro assessment, pt found to be much more drowsy than in previous assessments. Pt required copious amount of stimulation to stay awake for completion of NIH stroke scale. Norco given earlier in shift. Asked pt and wife about sensitivity to pain mediation, per pt and wife pt takes morphine at home and does not get very drowsy. [facility] in [facility] status. Per NP, draw all pending labs, and take pt to 0400 scans now.

## 2019-02-15 NOTE — PROGRESS NOTES
Physical Therapy Note: 
 
Orders received, chart reviewed. Speech therapist at bedside and inpatient rehabilitation MD about to go into room. Will attempt later as patient is able and schedule allows. Thank you, Art Salvador DPT

## 2019-02-15 NOTE — OP NOTES
Procedure: Mechanical Thrombectomy Right MCA  Surgeon: Dr. Mendez Ruiz  Assistant: Agapito Bethea NP  Pre-op Dx: Right MCA Occlusion  Post-op Dx: same  Anesthesia: Conscious sedation with fentanyl and versed  Complications: None  Findings: Occlusion of right MCA

## 2019-02-15 NOTE — CONSULTS
PM&R Rehab Consult    Subjective:     Date of Consultation:  February 15, 2019    Referring Physician: Dr. Carina Corea    Patient is a 76 y.o. male who is being seen for rehab recommendations s/p R MCA thrombectomy/stroke    Principal Problem:    Cerebral infarction due to occlusion of right middle cerebral artery ([address] (2/14/2019)    Active Problems:    Essential hypertension (2/14/2019)    HPI; Mr Nena Barrientos is a previously functionally independent 65yo AA gentleman with hx of Htn , afib, type 2 DM, LLE DVT on Xarelto, hep C and chronic back pain who presented to [place] [address] ED with an acute onset of left hemiparesis and slurred speech on the afternoon of 2/14. He had complained of a right sided headache earlier that day. Head CTshowed no acute infarct or bleed. NIHSS was 7. He was transferred to [facility] for endovascular evaluation. Pt had a STAT CTA/CTP of the head and neck and was found to have a R. MCA occlusion and was emergently taken emergently for SUZIE by Dr Carina Corea. He had noted Left facial droop, LUE drift and right gaze preference. NIHSS post proc 5-6. I am seeing him now in the ICU. He is sitting up in a chair and is AOx4. He has been hypertensive and is on a cardene gtt. Post proc Head CT shows High attenuation area in the right temporal lobe and right basal ganglia, likely contrast staining, status post right MCA thrombectomy. ST has advanced to a [facility] soft diabetic diet with thin liquids. hgbA1c is 6.4. Chol 223, trig 78, , HDL 55. Creat 0.75, mag 2.1 and Na 141  Pt reports being on chronic narcotics for back pain. He has had prior ESIs. He also gets injections to his shoulders for pain. He is fairly inactive bc of pain but is otherwise independent with gait and ADLs, and driving. Manages his own finances. No past medical history on file. No family history on file.    Social History     Tobacco Use    Smoking status: Not on file   Substance Use Topics    Alcohol use: Not on file     No past surgical history on file. Prior to Admission medications    Not on File     Allergies   Allergen Reactions    Niacin Unknown (comments)        Review of Systems:  A comprehensive review of systems was negative except for: Constitutional: positive for fatigue  Eyes: positive for visual disturbance  Cardiovascular: positive for fatigue  Gastrointestinal: positive for reflux symptoms and constipation  Hematologic/lymphatic: positive for easy bruising  Musculoskeletal: positive for stiff joints and back pain  Neurological: positive for paresthesia and gait problems  Behvioral/Psych: positive for depression    Objective:     Vitals:  Blood pressure 137/80, pulse 65, temperature 98.6 °F (37 °C), resp. rate 15, height 5' 8\" (1.727 m), weight 120 lb 13 oz (54.8 kg), SpO2 97 %. Temp (24hrs), Av °F (36.7 °C), Min:97.6 °F (36.4 °C), Max:98.6 °F (37 °C)      Intake and Output:   1901 - 02/15 0700  In: 824 [I.V.:433]  Out: 4767 [Urine:3615]    Physical Exam:  General:  Alert, oriented and mood affect blunted   Lungs:   Clear to auscultation bilaterally. Heart:  Regular rate and rhythm, S1, S2 stable, no murmur, click, rub or gallop. Abdomen:   Soft, non-tender. Bowel sounds present. No masses,  No organomegaly. Genitourinary: defer   Neuro Muscular: Speech is quiet but articulate  Follows commands , a bit delayed on the left  LUE drift; prox strength 4 on the left   near symm  Bilateral LE hip flexion weakness but DF and PF 5/5 b  No pathological reflexes  diffiuculty with proprioc testing   Skin:  No rashes, lesions, or signs/symptoms or infection.  No edema       Labs/Studies:  Recent Results (from the past 72 hour(s))   EKG, 12 LEAD, SUBSEQUENT    Collection Time: 19 10:28 PM   Result Value Ref Range    Ventricular Rate 93 BPM    Atrial Rate 340 BPM    QRS Duration 76 ms    Q-T Interval 344 ms    QTC Calculation (Bezet) 427 ms    Calculated R Axis 38 degrees    Calculated T Axis 95 degrees    Diagnosis Atrial fibrillation with premature ventricular or aberrantly conducted   complexes  Nonspecific T wave abnormality  Abnormal ECG  No previous ECGs available  Confirmed by Shira Montejo ([id number]) on 2/15/2019 7:12:00 AM     MAGNESIUM    Collection Time: 02/15/19 12:35 AM   Result Value Ref Range    Magnesium 2.0 1.8 - 2.4 mg/dL   CK    Collection Time: 02/15/19 12:35 AM   Result Value Ref Range    CK 98 21 - 215 U/L   TROPONIN I    Collection Time: 02/15/19 12:35 AM   Result Value Ref Range    Troponin-I, Qt. 0.31 (HH) 0.02 - 0.05 NG/ML   LIPID PANEL    Collection Time: 02/15/19 12:35 AM   Result Value Ref Range    LIPID PROFILE          Cholesterol, total 223 (H) <200 MG/DL    Triglyceride 78 35 - 150 MG/DL    HDL Cholesterol 55 40 - 60 MG/DL    LDL, calculated 152.4 (H) <100 MG/DL    VLDL, calculated 15.6 6.0 - 23.0 MG/DL    CHOL/HDL Ratio 4.1     HEMOGLOBIN A1C WITH EAG    Collection Time: 02/15/19 12:35 AM   Result Value Ref Range    Hemoglobin A1c 6.4 (H) 4.8 - 6.0 %    Est. average glucose 137 mg/dL   PROTHROMBIN TIME + INR    Collection Time: 02/15/19 12:35 AM   Result Value Ref Range    Prothrombin time 13.3 11.7 - 14.5 sec    INR 1.0     METABOLIC PANEL, BASIC    Collection Time: 02/15/19 12:35 AM   Result Value Ref Range    Sodium 141 136 - 145 mmol/L    Potassium 3.8 3.5 - 5.1 mmol/L    Chloride 109 (H) 98 - 107 mmol/L    CO2 26 21 - 32 mmol/L    Anion gap 6 (L) 7 - 16 mmol/L    Glucose 118 (H) 65 - 100 mg/dL    BUN 11 8 - 23 MG/DL    Creatinine 0.75 (L) 0.8 - 1.5 MG/DL    GFR est AA >60 >60 ml/min/1.73m2    GFR est non-AA >60 >60 ml/min/1.73m2    Calcium 8.2 (L) 8.3 - 10.4 MG/DL   TYPE & SCREEN    Collection Time: 02/15/19 12:35 AM   Result Value Ref Range    Crossmatch Expiration 02/18/2019     ABO/Rh(D) A POSITIVE     Antibody screen NEG    CBC W/O DIFF    Collection Time: 02/15/19 12:35 AM   Result Value Ref Range    WBC 5.1 4.3 - 11.1 K/uL    RBC 5.60 4.23 - 5.6 M/uL    HGB 15.1 13.6 - 17.2 g/dL HCT 47.8 41.1 - 50.3 %    MCV 85.4 79.6 - 97.8 FL    MCH 27.0 26.1 - 32.9 PG    MCHC 31.6 31.4 - 35.0 g/dL    RDW 14.5 11.9 - 14.6 %    PLATELET 488 052 - 622 K/uL    MPV 10.8 9.4 - 12.3 FL    ABSOLUTE NRBC 0.00 0.0 - 0.2 K/uL   PTT    Collection Time: 02/15/19 12:35 AM   Result Value Ref Range    aPTT 26.9 24.7 - 39.8 SEC   GLUCOSE, POC    Collection Time: 02/15/19  1:34 AM   Result Value Ref Range    Glucose (POC) 105 (H) 65 - 100 mg/dL   CBC W/O DIFF    Collection Time: 02/15/19  4:20 AM   Result Value Ref Range    WBC 5.9 4.3 - 11.1 K/uL    RBC 5.45 4.23 - 5.6 M/uL    HGB 14.6 13.6 - 17.2 g/dL    HCT 46.1 41.1 - 50.3 %    MCV 84.6 79.6 - 97.8 FL    MCH 26.8 26.1 - 32.9 PG    MCHC 31.7 31.4 - 35.0 g/dL    RDW 14.6 11.9 - 14.6 %    PLATELET 176 (L) 584 - 450 K/uL    MPV 11.7 9.4 - 12.3 FL    ABSOLUTE NRBC 0.00 0.0 - 0.2 K/uL   PTT    Collection Time: 02/15/19  4:20 AM   Result Value Ref Range    aPTT 26.9 24.7 - 39.8 SEC   MAGNESIUM    Collection Time: 02/15/19  4:20 AM   Result Value Ref Range    Magnesium 2.1 1.8 - 2.4 mg/dL   TROPONIN I    Collection Time: 02/15/19  4:20 AM   Result Value Ref Range    Troponin-I, Qt. 0.30 (HH) 0.02 - 0.05 NG/ML   DRUG SCREEN, URINE    Collection Time: 02/15/19  6:05 AM   Result Value Ref Range    PCP(PHENCYCLIDINE) NEGATIVE       BENZODIAZEPINES POSITIVE      COCAINE NEGATIVE       AMPHETAMINES NEGATIVE       METHADONE NEGATIVE       THC (TH-CANNABINOL) NEGATIVE       OPIATES POSITIVE      BARBITURATES NEGATIVE      GLUCOSE, POC    Collection Time: 02/15/19  6:11 AM   Result Value Ref Range    Glucose (POC) 108 (H) 65 - 100 mg/dL   EKG, 12 LEAD, SUBSEQUENT    Collection Time: 02/15/19  6:57 AM   Result Value Ref Range    Ventricular Rate 66 BPM    Atrial Rate 394 BPM    QRS Duration 66 ms    Q-T Interval 410 ms    QTC Calculation (Bezet) 429 ms    Calculated R Axis 52 degrees    Calculated T Axis -150 degrees    Diagnosis       Atrial fibrillation  ST & T wave abnormality, consider inferolateral ischemia  Abnormal ECG  When compared with ECG of 14-FEB-2019 22:28,  ST now depressed in Inferior leads  Inverted T waves have replaced nonspecific T wave abnormality in Inferior   leads  Confirmed by SONAL CORONADO (), Josee Medina ([id number]) on 2/15/2019 7:44:25 AM           Speech Assessment:    Dysphagia Screening  Vocal Quality/Secretions: Normal  History of Dysphagia: No  O2 Saturation: Normal  Alertness: Normal  Pre-Swallow Assessment Score: 0  Purees: No difficulty noted  Water by Cup: No difficulty noted  Water by Straw: No difficulty noted  Aspiration Signs/Symptoms: None    Effective Modifications: None        Ambulation:  Activity and Safety  Activity Level: Bed Rest  Ambulate: No (Comment)  Activity: In recliner  Activity Assistance: Partial (one person)  Mode of Transportation: Oxygen, IV equipment, Stretcher  Repositioned: Head of bed elevated (degrees)  Patient Turned: Turns self  Head of Bed Elevated: HOB less then 20  Assistive Device: Fall prevention device  Safety Measures: Bed/Chair alarm on, Bed/Chair-Wheels locked, Bed in low position, Side rails X 3     Impression/Plan:     Principal Problem:    Cerebral infarction due to occlusion of right middle cerebral artery ([address] (2/14/2019)    Active Problems:    Essential hypertension (2/14/2019)    R. MCA thrombus /stroke with successful R MCA mechanical thrombectomy    Recommendations: Continue Acute Rehab Program  Coordination of rehab/medical care  Counseling of PM & R care issues management  Monitoring and management of medical conditions per plan of care/orders   -Pt/OT pending, family feels he is at cogn baseline, passed swallow eval for mech soft with thins  -BP mgt; on cardene gtt, started on Prinzide  -cont statin, will defer to Dr Tosha Ashford when Ariel Mazariegos will be restarted  -will f/u Monday. I suspect he will do quite well. Family is defensive of any mention of deficits.  Rehab potential is excellent  Discussion with Family/Caregiver/Staff  Reviewed Therapies/Labs/Meds/Records  Thank you  Signed By:  Harlow Goltz, MD     February 15, 2019

## 2019-02-15 NOTE — PROGRESS NOTES
Initial visit was made, emotional support and a spiritual presence were provided.  card was left with the patient. His wife shared that they are Jehov[employer] Witnesses. Sugar Zamudio

## 2019-02-15 NOTE — PROGRESS NOTES
TRANSFER - IN REPORT: 
 
Verbal report received from [address] [place], [place] [id number] Soto RN(name) on Abeba Laguerre  being received from ED(unit) for routine progression of care Report consisted of patients Situation, Background, Assessment and  
Recommendations(SBAR). Information from the following report(s) SBAR, Kardex, ED Summary, MAR, Recent Results and Cardiac Rhythm SR was reviewed with the receiving nurse. Opportunity for questions and clarification was provided. Assessment completed upon patients arrival to unit and care assumed.

## 2019-02-15 NOTE — PROGRESS NOTES
02/14/19 2200 Assessment  Type Assessment Type Reassessment Reassessment Performed No change to previous assessment NIH Stroke Scale Interval (Admit to ICU. Joint NIH SS with Tiffany RN)  
LOC 0  
LOC Questions 0 LOC Commands 0 Best Gaze 1 Visual 0 Facial Palsy 2 Motor Right Arm 0 Motor Left Arm 1 Motor Right Leg 0 Motor Left Leg 1 Limb Ataxia 0 Sensory 0 Best Language 0 Dysarthria 1 Extinction and Inattention 1 Total 7  
RLE Peripheral Vascular Capillary Refill Less than/equal to 3 seconds Color Appropriate for race Temperature Warm Femoral Pulse Palpable;Present;+2 Post-tibial Pulse Palpable;Present;+1 Pedal Pulse Palpable;Present;+2 Varicose Veins Present No  
Post-Procedure Site Assessment (1) Wound Type Incision Location Groin Orientation  Right Hemostasis  Dressing applied during procedure Closure Device Angioseal  
Site Assessment No bleeding; No hematoma;Dry/intact Connected to ICU monitors s/p SUZIE. Emergency meds at bs. Full o2 tank and ambu with mask at bs. Transported to CT scan for [facility]. Transferred to ICU. Admit to 310. SBAR given to [employer]. Joint NIHSS and Joint neurovascular assessment/groin assessment done for continuation of care. TRANSFER - OUT REPORT: 
 
Verbal report given to Tiffany on Christine Victor Hugo  being transferred to 370(unit) for routine progression of care Report consisted of patients Situation, Background, Assessment and  
Recommendations(SBAR). Information from the following report(s) SBAR, ED Summary, Procedure Summary, Intake/Output, MAR, Recent Results, Med Rec Status, Cardiac Rhythm AFib with CVR and Alarm Parameters  was reviewed with the receiving nurse. Lines:  
Peripheral IV 02/14/19 Distal;Left;Posterior Forearm (Active) Peripheral IV 02/14/19 Distal;Posterior;Right Forearm (Active) Opportunity for questions and clarification was provided. Patient transported with: 
 Monitor O2 @ 2 liters Registered Nurse

## 2019-02-15 NOTE — PROGRESS NOTES
SPEECH PATHOLOGY NOTE: 
 
Speech therapy consult received and appreciated. Chart review completed, and case discussed with RN. Currently getting ECHO. Will re-attempt dysphagia assessment later today. ADDENDUM: Second attempt this morning, but patient again unavailable. Will continue to follow.   
 
[address] CCC-SLP

## 2019-02-15 NOTE — PROGRESS NOTES
Pt to ICU via bed on 2 L NC. Pt is alert and oriented. Dual NIH scale assessment performed with Branden Espinal RN. Upon skin assessment, pt has R groin site with gauze and tegaderm dressing. Dressing is clean dry and intact. No signs of hematoma or bleeding. Otherwise skin is unremarkable. Foam dressing applied.

## 2019-02-15 NOTE — PROGRESS NOTES
Problem: Self Care Deficits Care Plan (Adult) Goal: *Acute Goals and Plan of Care (Insert Text) 1. Pt will toilet with SBA 2. Pt will complete functional mobility for ADLs with SBA 3. Pt will complete lower body dressing with SBA using AE as needed 4. Pt will complete grooming and hygiene at sink with SBA 5. Pt will demonstrate independence with HEP to promote increased LUE strength, coordination,and functional use for ADLs 6. Pt will complete UB bathing and dressing with SBA using adaptive techniques as needed 7. Pt will complete bed mobility with SBA in prep for ADLs Timeframe: 7 days OCCUPATIONAL THERAPY: Initial Assessment and Daily Note 2/15/2019INPATIENT: OT Visit Days: 1 Payor: /  
  
NAME/AGE/GENDER: Florin Fierro is a 76 y.o. male PRIMARY DIAGNOSIS:  Acute right arterial ischemic stroke, MCA (middle cerebral artery) ([facility] [address] [facility] [I63.511] Cerebral infarction due to occlusion of right middle cerebral artery (HCC) Cerebral infarction due to occlusion of right middle cerebral artery ([facility] [address] ICD-10: Treatment Diagnosis:  
 · Hemiplegia and hemiparesis following cerebral infarction affecting left non-dominant side (Y39.274) Precautions/Allergies: 
  falls Niacin ASSESSMENT:  
Mr. Jenna Hatfield was admitted with L side weakness, facial droop, and slurred speech. MRI + R MCA CVA, s/p thrombectomy. Pt lives alone and is independent and drives at baseline, occasionally uses a cane for mobility. Pt's estranged wife arrived at end of session and stated that she will be able to assist pt post d/c. This session, pt presented with L side weakness and impaired coordination, L inattention, and impaired cognition, mobility, and balance impacting ADLs. Pt A&O X4, very drowsy and lethargic with impaired attention and processing, required frequent redirection and cues to attend and extra time for processing.  LUE strength grossly 3+/5, drift, able to obtain near full ROM with extra time, < [facility] and [address] [place] [address], sensation intact. Pt required mod assistance for transfer to edge of bed, CGA to scoot to the edge. Pt stood with CGA, min assistance for transfer to chair using RW with cues for technique. Pt/ family educated on positioning and on maximizing use of LUE and on HEP for strengthening and functional return. Pt is below his functional baseline and would benefit from skilled OT services to address deficits, would likely benefit from rehab post d/c but family is adamant that pt will return home with her assistance and home health services. This section established at most recent assessment PROBLEM LIST (Impairments causing functional limitations): 1. Decreased Strength 2. Decreased ADL/Functional Activities 3. Decreased Transfer Abilities 4. Decreased Balance 5. Decreased Cognition INTERVENTIONS PLANNED: (Benefits and precautions of occupational therapy have been discussed with the patient.) 1. Activities of daily living training 2. Adaptive equipment training 3. Balance training 4. Clothing management 5. Cognitive training 6. Donning&doffing training 7. Therapeutic activity 8. Therapeutic exercise TREATMENT PLAN: Frequency/Duration: Follow patient 3 times/ week to address above goals. Rehabilitation Potential For Stated Goals: Good RECOMMENDED REHABILITATION/EQUIPMENT: (at time of discharge pending progress): Due to the probability of continued deficits (see above) this patient will likely need continued skilled occupational therapy after discharge. Equipment:  
? 3:1 [facility] OCCUPATIONAL PROFILE AND HISTORY:  
History of Present Injury/Illness (Reason for Referral): 
See H&P Past Medical History/Comorbidities:  
Mr. Kelby Hooker  has no past medical history on file. Mr. Kelby Hooker  has no past surgical history on file. Social History/Living Environment:  
Home Environment: Private residence One/Two Story Residence: One story Living Alone: Yes Support Systems: Family member(s) Patient Expects to be Discharged to[de-identified] Private residence Current DME Used/Available at Home: Cane, straight Tub or Shower Type: Tub/Shower combination Prior Level of Function/Work/Activity: 
Independent, lives alone, occasionally uses a cane Number of Personal Factors/Comorbidities that affect the Plan of Care: Expanded review of therapy/medical records (1-2):  MODERATE COMPLEXITY ASSESSMENT OF OCCUPATIONAL PERFORMANCE[de-identified]  
Activities of Daily Living:  
Basic ADLs (From Assessment) Complex ADLs (From Assessment) Feeding: Setup Oral Facial Hygiene/Grooming: Minimum assistance Bathing: Minimum assistance Upper Body Dressing: Minimum assistance Lower Body Dressing: Moderate assistance Toileting: Minimum assistance Instrumental ADL Meal Preparation: Maximum assistance Homemaking: Maximum assistance Grooming/Bathing/Dressing Activities of Daily Living Grooming Washing Face: Supervision/set-up Cognitive Retraining Safety/Judgement: Decreased insight into deficits; Fall prevention Lower Body Dressing Assistance Socks: Moderate assistance Bed/Mat Mobility Supine to Sit: Moderate assistance Sit to Stand: Contact guard assistance Bed to Chair: Minimum assistance Scooting: Contact guard assistance Most Recent Physical Functioning:  
Gross Assessment: 
AROM: Generally decreased, functional 
PROM: Within functional limits Strength: Generally decreased, functional 
Coordination: Generally decreased, functional 
Tone: Normal 
Sensation: Intact Posture: 
  
Balance: 
Sitting: Impaired Sitting - Static: Good (unsupported) Sitting - Dynamic: Fair (occasional) Standing: Impaired Standing - Static: Fair Standing - Dynamic : Fair Bed Mobility: 
Supine to Sit: Moderate assistance Scooting: Contact guard assistance Wheelchair Mobility: 
  
Transfers: 
Sit to Stand: Contact guard assistance Stand to Sit: Contact guard assistance Bed to Chair: Minimum assistance Patient Vitals for the past 6 hrs: 
 BP SpO2 O2 Flow Rate (L/min) Pulse 02/15/19 0715   2 l/min   
02/15/19 0717 (!) 131/91 96 %  80  
02/15/19 0731 141/77 97 %  69  
02/15/19 0746 128/79 95 %  64  
02/15/19 0800 140/76 95 %  70  
02/15/19 0816 137/80 97 %  65 Mental Status Neurologic State: Alert Orientation Level: Oriented X4 Cognition: Decreased attention/concentration, Follows commands Perception: Cues to attend left visual field Perseveration: No perseveration noted Safety/Judgement: Decreased insight into deficits, Fall prevention Physical Skills Involved: 
1. Balance 2. Strength 3. Activity Tolerance 4. Fine Motor Control 5. Gross Motor Control 6. Vision Cognitive Skills Affected (resulting in the inability to perform in a timely and safe manner): 1. Perception 2. Executive Function 3. Sustained Attention 4. Divided Attention Psychosocial Skills Affected: 1. Habits/Routines 2. Environmental Adaptation Number of elements that affect the Plan of Care: 3-5:  MODERATE COMPLEXITY CLINICAL DECISION MAKING:  
[facility] AM-PAC 6 Clicks Daily Activity Inpatient Short Form How much help from another person does the patient currently need. .. Total A Lot A Little None 1. Putting on and taking off regular lower body clothing? [] 1   [x] 2   [] 3   [] 4  
2. Bathing (including washing, rinsing, drying)? [] 1   [x] 2   [] 3   [] 4  
3. Toileting, which includes using toilet, bedpan or urinal?   [] 1   [x] 2   [] 3   [] 4  
4. Putting on and taking off regular upper body clothing? [] 1   [] 2   [x] 3   [] 4  
5. Taking care of personal grooming such as brushing teeth? [] 1   [] 2   [x] 3   [] 4  
6. Eating meals? [] 1   [] 2   [x] 3   [] 4  
© 2007, Trustees of [facility], under license to "[employer]. All rights reserved Score:  Initial: 15 Most Recent: X (Date: -- ) Interpretation of Tool:  Represents activities that are increasingly more difficult (i.e. Bed mobility, Transfers, Gait). Medical Necessity:    
· Patient demonstrates good rehab potential due to higher previous functional level. Reason for Services/Other Comments: 
· Patient continues to require present interventions due to patient's inability to independently complete ADLs. Use of outcome tool(s) and clinical judgement create a POC that gives a: MODERATE COMPLEXITY  
 
 
 
TREATMENT:  
(In addition to Assessment/Re-Assessment sessions the following treatments were rendered) Pre-treatment Symptoms/Complaints:   
Pain: Initial:  
Pain Intensity 1: 0  Post Session:  0 Therapeutic Activity: (    10 min): Therapeutic activities including Bed transfers, Chair transfers and AROM HEP to improve mobility, strength, balance and coordination. Braces/Orthotics/Lines/Etc:  
· IV 
· carson catheter · ICU monitoring · O2 Device: Nasal cannula Treatment/Session Assessment:   
· Response to Treatment:  No adverse reaction · Interdisciplinary Collaboration:  
o Occupational Therapist 
o Registered Nurse · After treatment position/precautions:  
o Up in chair 
o Bed/Chair-wheels locked 
o Bed in low position 
o Call light within reach 
o RN notified 
o Family at bedside · Compliance with Program/Exercises: Compliant most of the time. · Recommendations/Intent for next treatment session: \"Next visit will focus on advancements to more challenging activities and reduction in assistance provided\". Total Treatment Duration: OT Patient Time In/Time Out Time In: 2398 Time Out: 1958 Shakir Ring OT

## 2019-02-15 NOTE — PROGRESS NOTES
Sedation given during procedure. APRN with advanced airway skills at bedside. Respiratory equipment available.

## 2019-02-15 NOTE — PROGRESS NOTES
Problem: Nutrition Deficit Goal: *Optimize nutritional status Nutrition: 
Nutrition Consult for General Nutrition Management & Supplement. (Dr. Dipti Archer, NP) Assessment: Anthropometrics: Ht - 5'8\", wgt - 90.8 kg (ICU bed 2/15/19), BMI - 30.4 c/w overweight in a person >72years of age, edema - none. Macronutrient Needs: 
Estimated calorie needs - 9028-5515 christine/day (15-20 christine/kg/day) Estimated protein needs - 56-84 gm pro/day (0.8-1.2 gm pro/kgIBW/day) (GFR >60 ml/min) Intake/Comparative Standards: 
 Minimal oral intake documented. Visual observation at lunch today - ate cornbread and was asleep. Pertinent Labs: 
 AM glucose 118, hemoglobin A1c 6.4; POC glucose 105,108,121. Pertinent Medications: SSI coverage. Diet: 
 [facility]. Food/Nutrition History: 
 76year old gentleman with a h/o diabetes and current LLE DVT admitted with right MCA occlusion resulting in stroke. He reported right sided headache then slurred speech and left hemiparesis was observed. He is now s/p mechanical thrombectomy on 2/14/19. Diagnosis (Nutrition): Inadequate oral intake related to decreased ability to consume sufficient oral intake as evidenced by asleep with his lunch tray in front of him with little consumed. Intervention: 
Meals and Snacks: [facility] soft. Medical Food Supplement Therapy: Commercial Beverage: Start Glucerna Shake (220 calories, 10 gm protein) with all trays. Coordination of Nutrition Care by a Nutrition Professional: AM ICU rounds, collaboration with Noelle Fernández, RN. Nutrition Discharge Plan: Too soon to determine. Jane Hurst. Kwabena Hernandez 
[phone]

## 2019-02-15 NOTE — PROGRESS NOTES
PPD for any potential STR at [facility]. PT/OT/Speech per MD when medically stable.

## 2019-02-15 NOTE — H&P
History and Physical 
 
Patient: Gray Severs MRN: [id number]  SSN: [id number] YOB: 1950  Age: 76 y.o. Sex: male Subjective:  
  
Gray Severs is a 76 y.o. R hand dominant male who who originally presented to [facility] [place] [address] ED with acute onset of left hemiparesis and slurred speech witnessed by friend around 1600 today. Pt complained of R sided headache prior to event. Pt on Xarelto for known LLE DVT per report. Pt with hx of of HTN, DM and depression and PCP is [facility] in [place], [place]. Pt was not candidate for tPa due to current use of Xarelto for LLE DVT. Pt had CT head at [facility], no acute infarct noted or ICH. Pt transferred to [address] [place] [address] for further evaluation for Endovascular intervention. Pt had STAT CTA/CTP of head and neck upon arrival and found to have R MCA occlusion and was taken emergently for SUZIE with Dr. Cynda Bumpers. Pt airway patent, he would follow commands, he had obvious left facial droop, LUE drift, and R gaze preference. NIHSS of 7. No past medical history on file. afib, DMII, depression, LLE DVT, Hep C ( per records) No past surgical history on file. Hand surgery 2003. No family history on file. DM ( aunt), Social History Tobacco Use  Smoking status: Not on file Substance Use Topics  Alcohol use: Not on file Prior to Admission medications Not on File Allergies Allergen Reactions  Niacin Unknown (comments) Review of Systems: 
Per report: right sided headache prior to acute left hemiparesis and slurred speech today at 1600. Pt can speak, but can't remember all meds. He denies any falls or prior stroke. Objective:  
 
Vitals:  
 02/14/19 2110 02/14/19 2115 02/14/19 2120 02/14/19 2125 BP: 164/85 (!) 186/99 (!) 163/92 (!) 158/93 Pulse: 78 84 98 Resp:      
  
 
Physical Exam: 
General:  Alert, cooperative, no distress, airway patent. Siegel Manuel HEENT: Supple, symmetrical, trachea midline, no adenopathy, thyroid: no enlargment/tenderness/nodules, no carotid bruit and no JVD. Lungs:   Clear to auscultation bilaterally. Heart:  Irregular, afib, no MGR. Abdomen:   Soft, non-tender. Bowel sounds normal. No masses,  No organomegaly. Extremities: Extremities normal, atraumatic, no cyanosis or edema. Left side weaker than R. But maew. Pulses: 2+ and symmetric all extremities. Skin: Skin color, texture, turgor normal. No rashes or lesions Neurologic: Pt aox name, place, follows commands, has obvious R gaze deviation, left facial droop, LUE mild drift. Assessment:  
 
Hospital Problems  Never Reviewed Codes Class Noted POA Ischemic stroke ([facility] [address] ICD-10-CM: I63.9 ICD-9-CM: 434.91  2/14/2019 Yes Acute right arterial ischemic stroke, MCA (middle cerebral artery) (HCC) ICD-10-CM: O14.697 ICD-9-CM: 434.91  2/14/2019 Unknown Essential hypertension ICD-10-CM: I10 
ICD-9-CM: 401.9  2/14/2019 Yes NIHSS ON ADMIT: 7 DYSPHAGIA SCORE ON ADMIT: 0 ( NPO). Plan:  
 
NEURO: pt presents with acute left sided weakness secondary to R MCA occlusion. Pt with NIHSS of 7 upon admit. CTA/CTP shows pt with R MCA occlusion with a large penumbra and a good candidate for Mechanical thrombectomy. TICI 3 results with NIHSS 5-6 post procedure. Pt is aox3 and has improved R gaze and decreased Left facial droop. Pt rodriguez, LUE drift has improved. Pt will be admitted to ICU under post SUZIE protocol. Post head ct shows contrast staining R MCA region, will repeat imaging at 0400 this am, sooner if any acute neuro changes. Will verify home meds, hold xarelto for now. Labs pending. RESP: pt tolerating room air. No distress. CV: SBP goal 120-180, cardene gtt prn, 2D echo pending. Trop labs pending. Pt chest pain free. HEME: [facility] 16/48, Plt 168,  INR 0.9, PTT 25. from sending facility. NEPH: Bun/Cr: 13/1.2,  
GI:NPO until cleared by ST or Rn at bedside. , pepcid, nutrition consult. ID: [email or web address], no abx.   
LINES: alli, IV x2. Attempt to call family prior to procedure, no answer. No family available at hospital, pt taken emergently for SUZIE of R MCA. Signed By: Hue Montes NP February 14, 2019

## 2019-02-16 ENCOUNTER — APPOINTMENT (OUTPATIENT)
Dept: CT IMAGING | Age: 69
DRG: 023 | End: 2019-02-16
Attending: NEUROLOGICAL SURGERY
Payer: MEDICARE

## 2019-02-16 LAB
ANION GAP SERPL CALC-SCNC: 5 MMOL/L (ref 7–16)
APTT PPP: 28.2 SEC (ref 24.7–39.8)
BUN SERPL-MCNC: 10 MG/DL (ref 8–23)
CALCIUM SERPL-MCNC: 8.5 MG/DL (ref 8.3–10.4)
CHLORIDE SERPL-SCNC: 107 MMOL/L (ref 98–107)
CO2 SERPL-SCNC: 29 MMOL/L (ref 21–32)
CREAT SERPL-MCNC: 0.91 MG/DL (ref 0.8–1.5)
ERYTHROCYTE [DISTWIDTH] IN BLOOD BY AUTOMATED COUNT: 14.3 % (ref 11.9–14.6)
GLUCOSE BLD STRIP.AUTO-MCNC: 125 MG/DL (ref 65–100)
GLUCOSE BLD STRIP.AUTO-MCNC: 140 MG/DL (ref 65–100)
GLUCOSE BLD STRIP.AUTO-MCNC: 95 MG/DL (ref 65–100)
GLUCOSE SERPL-MCNC: 109 MG/DL (ref 65–100)
HCT VFR BLD AUTO: 46.7 % (ref 41.1–50.3)
HGB BLD-MCNC: 14.9 G/DL (ref 13.6–17.2)
MAGNESIUM SERPL-MCNC: 2.1 MG/DL (ref 1.8–2.4)
MCH RBC QN AUTO: 27 PG (ref 26.1–32.9)
MCHC RBC AUTO-ENTMCNC: 31.9 G/DL (ref 31.4–35)
MCV RBC AUTO: 84.8 FL (ref 79.6–97.8)
NRBC # BLD: 0 K/UL (ref 0–0.2)
PLATELET # BLD AUTO: 173 K/UL (ref 150–450)
PMV BLD AUTO: 10.6 FL (ref 9.4–12.3)
POTASSIUM SERPL-SCNC: 4.2 MMOL/L (ref 3.5–5.1)
RBC # BLD AUTO: 5.51 M/UL (ref 4.23–5.6)
SODIUM SERPL-SCNC: 141 MMOL/L (ref 136–145)
WBC # BLD AUTO: 5.8 K/UL (ref 4.3–11.1)

## 2019-02-16 PROCEDURE — 80048 BASIC METABOLIC PNL TOTAL CA: CPT

## 2019-02-16 PROCEDURE — 83735 ASSAY OF MAGNESIUM: CPT

## 2019-02-16 PROCEDURE — 82962 GLUCOSE BLOOD TEST: CPT

## 2019-02-16 PROCEDURE — 36415 COLL VENOUS BLD VENIPUNCTURE: CPT

## 2019-02-16 PROCEDURE — 77010033678 HC OXYGEN DAILY

## 2019-02-16 PROCEDURE — 74011250637 HC RX REV CODE- 250/637: Performed by: NURSE PRACTITIONER

## 2019-02-16 PROCEDURE — 70450 CT HEAD/BRAIN W/O DYE: CPT

## 2019-02-16 PROCEDURE — 94760 N-INVAS EAR/PLS OXIMETRY 1: CPT

## 2019-02-16 PROCEDURE — 85730 THROMBOPLASTIN TIME PARTIAL: CPT

## 2019-02-16 PROCEDURE — 74011250637 HC RX REV CODE- 250/637: Performed by: NEUROLOGICAL SURGERY

## 2019-02-16 PROCEDURE — 65660000000 HC RM CCU STEPDOWN

## 2019-02-16 PROCEDURE — 85027 COMPLETE CBC AUTOMATED: CPT

## 2019-02-16 PROCEDURE — 99231 SBSQ HOSP IP/OBS SF/LOW 25: CPT | Performed by: NURSE PRACTITIONER

## 2019-02-16 PROCEDURE — 74011250636 HC RX REV CODE- 250/636: Performed by: NEUROLOGICAL SURGERY

## 2019-02-16 RX ORDER — INSULIN LISPRO 100 [IU]/ML
INJECTION, SOLUTION INTRAVENOUS; SUBCUTANEOUS
Status: DISCONTINUED | OUTPATIENT
Start: 2019-02-16 | End: 2019-02-19 | Stop reason: HOSPADM

## 2019-02-16 RX ADMIN — SERTRALINE 100 MG: 100 TABLET, FILM COATED ORAL at 17:23

## 2019-02-16 RX ADMIN — ASPIRIN 81 MG 81 MG: 81 TABLET ORAL at 08:36

## 2019-02-16 RX ADMIN — LISINOPRIL AND HYDROCHLOROTHIAZIDE 1 TABLET: 12.5; 2 TABLET ORAL at 08:36

## 2019-02-16 RX ADMIN — DOCUSATE SODIUM 100 MG: 100 CAPSULE, LIQUID FILLED ORAL at 17:23

## 2019-02-16 RX ADMIN — ROSUVASTATIN CALCIUM 40 MG: 20 TABLET, FILM COATED ORAL at 21:29

## 2019-02-16 RX ADMIN — DOCUSATE SODIUM 100 MG: 100 CAPSULE, LIQUID FILLED ORAL at 08:36

## 2019-02-16 RX ADMIN — ENOXAPARIN SODIUM 40 MG: 40 INJECTION SUBCUTANEOUS at 15:21

## 2019-02-16 NOTE — PROGRESS NOTES
Shift assessment complete, chart reviewed. Patient A/O x 4, delayed responses, decreased attention noted. PERRLA, 3 mm. Left facial droop, slightly slurred speech, tongue midline. NIH 4. A Fib on monitor, rate controlled. BP stable. Breath sounds clear, NC at 2 L. Attempted room air trial this AM, SpO2 decreased to 89%, placed back on 2 L via NC, encouraged cough and deep breathing. Abdomen is soft, intact, bowel sounds active, appetite good. Voiding incontinently, per pt wears briefs at home. Brief placed on patient at his request. CHG bath given this AM. Moves all extremities, left side deficit with slight drift to left arm. Pulses palpable and regular, no edema noted. Right groin intact, no s/s of bleeding or hematoma noted. No c/o pain at this time. VSS. NAD. Patient ambulated in hallway and up to recliner x 1 assist, tolerated well. Posey chair alarm in place, call light within reach. Wife at bedside.

## 2019-02-16 NOTE — PROGRESS NOTES
02/16/19 1031 Dual Skin Pressure Injury Assessment Dual Skin Pressure Injury Assessment WDL Second Care Provider (Based on [address] [place] [address]) Felicia Reza RN

## 2019-02-16 NOTE — PROGRESS NOTES
Problem: Patient Education: Go to Patient Education Activity Goal: Patient/Family Education Outcome: Progressing Towards Goal 
Needs reinforcement. Comments: Patient found ambulating without staff assistance with help by spouse. Education provided to both wife and pt regarding pt being very high fall risk and needing the assistance of at least one staff member. Reinforced purpose of call light and severity of patient situation.

## 2019-02-16 NOTE — PROGRESS NOTES
TRANSFER - OUT REPORT: 
 
Verbal report given to Luisa Coe RN (name) on Elijah Dangelo  being transferred to Room 703 on Remote tele (unit) for routine progression of care Report consisted of patients Situation, Background, Assessment and  
Recommendations(SBAR). Information from the following report(s) SBAR, Kardex, ED Summary, Procedure Summary, Intake/Output, MAR, Recent Results, Med Rec Status, Cardiac Rhythm A Fib and Alarm Parameters  was reviewed with the receiving nurse. Lines:  
Peripheral IV 02/14/19 Distal;Left;Posterior Forearm (Active) Site Assessment Clean, dry, & intact 2/16/2019  8:00 AM  
Phlebitis Assessment 0 2/16/2019  8:00 AM  
Infiltration Assessment 0 2/16/2019  8:00 AM  
Dressing Status Clean, dry, & intact 2/16/2019  8:00 AM  
Dressing Type Tape;Transparent 2/16/2019  8:00 AM  
Hub Color/Line Status Capped;Flushed 2/16/2019  8:00 AM  
Alcohol Cap Used No 2/16/2019  8:00 AM  
   
Peripheral IV 02/14/19 Distal;Posterior;Right Forearm (Active) Site Assessment Clean, dry, & intact 2/16/2019  8:00 AM  
Phlebitis Assessment 0 2/16/2019  8:00 AM  
Infiltration Assessment 0 2/16/2019  8:00 AM  
Dressing Status Clean, dry, & intact 2/16/2019  8:00 AM  
Dressing Type Tape;Transparent 2/16/2019  8:00 AM  
Hub Color/Line Status Capped;Flushed 2/16/2019  8:00 AM  
Alcohol Cap Used No 2/16/2019  8:00 AM  
  
 
Opportunity for questions and clarification was provided. Patient transported with: 
 Monitor O2 @ 2 liters Tech Patient wife at bedside, all belongings sent with patient. Transported to 7th floor via Wheelchair. NIH 4. Continued left facial droop, slurred speech, delayed responses, left side weakness with slight left arm drift. VSS. NAD.

## 2019-02-16 NOTE — PROGRESS NOTES
Progress Note Patient: Lucy Toribio MRN: [id number]  SSN: [id number] YOB: 1950  Age: 76 y.o. Sex: male Admit Date: 2/14/2019 LOS: 2 days Subjective: No acute neuro changes. Repeat Ct head this am is stable with decreased contrast staining. Pt aox3 on exam this am, following commands. Mild left facial droop, speech clear on my exam, no obvious LUE drift noted. Oriented to name/place and year. Current Facility-Administered Medications Medication Dose Route Frequency  insulin lispro (HUMALOG) injection   SubCUTAneous AC&HS  enoxaparin (LOVENOX) injection 40 mg  40 mg SubCUTAneous Q24H  
 aspirin chewable tablet 81 mg  81 mg Oral DAILY  tuberculin injection 5 Units  5 Units IntraDERMal ONCE  
 docusate sodium (COLACE) capsule 100 mg  100 mg Oral BID  
 bisacodyl (DULCOLAX) tablet 5 mg  5 mg Oral DAILY PRN  
 heparin (PF) 2 units/ml in NS infusion 2,000 Units  1,000 mL Irrigation Multiple  HYDROcodone-acetaminophen (NORCO) 7.5-325 mg per tablet 1 Tab  1 Tab Oral Q4H PRN  
 ondansetron (ZOFRAN) injection 4 mg  4 mg IntraVENous Q4H PRN  
 lisinopril-hydroCHLOROthiazide (PRINZIDE, ZESTORETIC) 20-12.5 mg per tablet 1 Tab  1 Tab Oral DAILY  rosuvastatin (CRESTOR) tablet 40 mg  40 mg Oral QHS  sertraline (ZOLOFT) tablet 100 mg  100 mg Oral QPM  
 
 
Objective:  
 
Vitals:  
 02/16/19 0804 02/16/19 0901 02/16/19 1001 02/16/19 1031 BP: (!) 132/93 126/72 113/89 122/86 Pulse: 89 96 93 77 Resp: 21 23 23 20 Temp: 99.3 °F (37.4 °C)   97.2 °F (36.2 °C) SpO2: 95% 95% 94% 94% Weight:      
Height:      
  
  
Intake and Output: 
Current Shift: No intake/output data recorded. Last 24 hr: 02/15 0701 - 02/16 0700 In: -  
Out: [facility] [[id number]] Physical Exam:  
General:  Alert, cooperative, no distress. .  
Eyes:  . PERRL, EOMs intact. Fundi benign Neck: Supple, symmetrical, trachea midline, no adenopathy, thyroid: no enlargment/tenderness/nodules, no carotid bruit and no JVD. Lungs:   Clear to auscultation bilaterally. Heart:  Regular rate and rhythm, S1, S2 normal, no murmur, click, rub or gallop. Abdomen:   Soft, non-tender. Bowel sounds normal. No masses,  No organomegaly. Extremities: Extremities normal, atraumatic, no cyanosis or edema. Pulses: 2+ and symmetric all extremities. Skin: Skin color, texture, turgor normal. No rashes or lesions Neurologic: CNII-XII intact. Normal strength, sensation and reflexes throughout. Lab/Data Review: 
 
Recent Results (from the past 12 hour(s)) GLUCOSE, POC Collection Time: 02/15/19 11:39 PM  
Result Value Ref Range Glucose (POC) 140 (H) 65 - 100 mg/dL CBC W/O DIFF Collection Time: 02/16/19  2:47 AM  
Result Value Ref Range WBC 5.8 4.3 - 11.1 K/uL  
 RBC 5.51 4.23 - 5.6 M/uL  
 HGB 14.9 13.6 - 17.2 g/dL HCT 46.7 41.1 - 50.3 % MCV 84.8 79.6 - 97.8 FL  
 MCH 27.0 26.1 - 32.9 PG  
 MCHC 31.9 31.4 - 35.0 g/dL  
 RDW 14.3 11.9 - 14.6 % PLATELET 934 014 - 585 K/uL MPV 10.6 9.4 - 12.3 FL ABSOLUTE NRBC 0.00 0.0 - 0.2 K/uL PTT Collection Time: 02/16/19  2:47 AM  
Result Value Ref Range aPTT 28.2 24.7 - 39.8 SEC MAGNESIUM Collection Time: 02/16/19  2:47 AM  
Result Value Ref Range Magnesium 2.1 1.8 - 2.4 mg/dL METABOLIC PANEL, BASIC Collection Time: 02/16/19  2:47 AM  
Result Value Ref Range Sodium 141 136 - 145 mmol/L Potassium 4.2 3.5 - 5.1 mmol/L Chloride 107 98 - 107 mmol/L  
 CO2 29 21 - 32 mmol/L Anion gap 5 (L) 7 - 16 mmol/L Glucose 109 (H) 65 - 100 mg/dL BUN 10 8 - 23 MG/DL Creatinine 0.91 0.8 - 1.5 MG/DL  
 GFR est AA >60 >60 ml/min/1.73m2 GFR est non-AA >60 >60 ml/min/1.73m2 Calcium 8.5 8.3 - 10.4 MG/DL  
GLUCOSE, POC Collection Time: 02/16/19  5:52 AM  
Result Value Ref Range Glucose (POC) 140 (H) 65 - 100 mg/dL Assessment/ Plan:  
 
Principal Problem: Cerebral infarction due to occlusion of right middle cerebral artery ([facility] [address] (2/14/2019) Active Problems: 
  Essential hypertension (2/14/2019) Nontraumatic subcortical hemorrhage of right cerebral hemisphere ([facility] [address] (2/15/2019) Paroxysmal atrial fibrillation ([facility] [address] (2/15/2019) 1. Ct head stable this am. Will transfer to 7th floor. 2. Continue PT/OT/ST and rehab: pending final plan for post acute stay. 3. 2D ECHO: reviewed with Dr. Vinayak Romeo, will discuss with Cardiology today, Dr. Mason Bedolla, and determine if SEFERINO needed as pt with possible thrombus noted on 2D. Pt is chest pain free and no SOB. Trop peak at 0.31, trended down. Will cont to hold xarelto for now per . 4. Pt on crestor, asa daily. 5. OOB daily, ambulate. Signed By: Dylan Martinez NP February 16, 2019

## 2019-02-16 NOTE — PROGRESS NOTES
Ordered guest tray for lunch and supper as requested Anderson Lomax, staff Eddy [address], [phone] [facility]  /   [email or web address]

## 2019-02-16 NOTE — PROGRESS NOTES
Bedside shift change report given to Chelly Calero RN (oncoming nurse) by Marina Olsen RN (offgoing nurse). Report included the following information SBAR, Kardex, ED Summary, Procedure Summary, Intake/Output, MAR, Recent Results, Med Rec Status, Cardiac Rhythm A Fib and Alarm Parameters . Dual neuro assessment completed at shift change, PERRLA, 3 mm, left facial droop, left arm/leg weakness/drift, speech slightly slurred. VSS. NAD.

## 2019-02-16 NOTE — PROGRESS NOTES
Bedside, Verbal and Written shift change report given to Delmer Nguyen RN (oncoming nurse) by Precoius Kelly RN (offgoing nurse). Report included the following information SBAR, Kardex, Intake/Output, Recent Results and Cardiac Rhythm a fib. Dual neuro assessment performed.

## 2019-02-16 NOTE — PROGRESS NOTES
Pt found to be up out of bed using the bathroom with wife standing beside him holding him up. Stressed need for RN to be with him when he ambulates. Turned bed exit alarm on, urinal at bedside. Call light handed to patient. Fall risk education provided to both patient and wife.

## 2019-02-17 LAB
ANION GAP SERPL CALC-SCNC: 7 MMOL/L (ref 7–16)
APTT PPP: 27.4 SEC (ref 24.7–39.8)
BUN SERPL-MCNC: 11 MG/DL (ref 8–23)
CALCIUM SERPL-MCNC: 8.5 MG/DL (ref 8.3–10.4)
CHLORIDE SERPL-SCNC: 108 MMOL/L (ref 98–107)
CO2 SERPL-SCNC: 25 MMOL/L (ref 21–32)
CREAT SERPL-MCNC: 0.77 MG/DL (ref 0.8–1.5)
ERYTHROCYTE [DISTWIDTH] IN BLOOD BY AUTOMATED COUNT: 13.8 % (ref 11.9–14.6)
GLUCOSE BLD STRIP.AUTO-MCNC: 100 MG/DL (ref 65–100)
GLUCOSE BLD STRIP.AUTO-MCNC: 102 MG/DL (ref 65–100)
GLUCOSE BLD STRIP.AUTO-MCNC: 102 MG/DL (ref 65–100)
GLUCOSE BLD STRIP.AUTO-MCNC: 156 MG/DL (ref 65–100)
GLUCOSE SERPL-MCNC: 119 MG/DL (ref 65–100)
HCT VFR BLD AUTO: 46.1 % (ref 41.1–50.3)
HGB BLD-MCNC: 14.8 G/DL (ref 13.6–17.2)
MAGNESIUM SERPL-MCNC: 1.9 MG/DL (ref 1.8–2.4)
MCH RBC QN AUTO: 26.9 PG (ref 26.1–32.9)
MCHC RBC AUTO-ENTMCNC: 32.1 G/DL (ref 31.4–35)
MCV RBC AUTO: 83.7 FL (ref 79.6–97.8)
NRBC # BLD: 0 K/UL (ref 0–0.2)
PLATELET # BLD AUTO: 192 K/UL (ref 150–450)
PMV BLD AUTO: 10.7 FL (ref 9.4–12.3)
POTASSIUM SERPL-SCNC: 3.9 MMOL/L (ref 3.5–5.1)
RBC # BLD AUTO: 5.51 M/UL (ref 4.23–5.6)
SODIUM SERPL-SCNC: 140 MMOL/L (ref 136–145)
WBC # BLD AUTO: 5.3 K/UL (ref 4.3–11.1)

## 2019-02-17 PROCEDURE — 85027 COMPLETE CBC AUTOMATED: CPT

## 2019-02-17 PROCEDURE — 85730 THROMBOPLASTIN TIME PARTIAL: CPT

## 2019-02-17 PROCEDURE — 74011636637 HC RX REV CODE- 636/637: Performed by: NURSE PRACTITIONER

## 2019-02-17 PROCEDURE — 74011250637 HC RX REV CODE- 250/637: Performed by: NEUROLOGICAL SURGERY

## 2019-02-17 PROCEDURE — 82962 GLUCOSE BLOOD TEST: CPT

## 2019-02-17 PROCEDURE — 65660000000 HC RM CCU STEPDOWN

## 2019-02-17 PROCEDURE — 74011250637 HC RX REV CODE- 250/637: Performed by: NURSE PRACTITIONER

## 2019-02-17 PROCEDURE — 83735 ASSAY OF MAGNESIUM: CPT

## 2019-02-17 PROCEDURE — 80048 BASIC METABOLIC PNL TOTAL CA: CPT

## 2019-02-17 PROCEDURE — 36415 COLL VENOUS BLD VENIPUNCTURE: CPT

## 2019-02-17 RX ADMIN — RIVAROXABAN 20 MG: 20 TABLET, FILM COATED ORAL at 08:00

## 2019-02-17 RX ADMIN — DOCUSATE SODIUM 100 MG: 100 CAPSULE, LIQUID FILLED ORAL at 17:27

## 2019-02-17 RX ADMIN — LISINOPRIL AND HYDROCHLOROTHIAZIDE 1 TABLET: 12.5; 2 TABLET ORAL at 08:00

## 2019-02-17 RX ADMIN — ASPIRIN 81 MG 81 MG: 81 TABLET ORAL at 08:00

## 2019-02-17 RX ADMIN — DOCUSATE SODIUM 100 MG: 100 CAPSULE, LIQUID FILLED ORAL at 08:00

## 2019-02-17 RX ADMIN — INSULIN LISPRO 3 UNITS: 100 INJECTION, SOLUTION INTRAVENOUS; SUBCUTANEOUS at 11:59

## 2019-02-17 RX ADMIN — SERTRALINE 100 MG: 100 TABLET, FILM COATED ORAL at 17:27

## 2019-02-17 RX ADMIN — ROSUVASTATIN CALCIUM 40 MG: 20 TABLET, FILM COATED ORAL at 21:05

## 2019-02-17 NOTE — PROGRESS NOTES
[facility] OUTREACH NURSE PROGRESS REPORT SUBJECTIVE: Called to assess patient secondary to transfer out of ICU. MEWS Score: 1 (02/17/19 1600) Vitals:  
 02/17/19 0404 02/17/19 0800 02/17/19 1200 02/17/19 1600 BP: 141/90 137/84 105/75 112/64 Pulse: 71 67 83 60 Resp: 20 18 18 20 Temp: 97.6 °F (36.4 °C) 98.2 °F (36.8 °C) 98.7 °F (37.1 °C) 98.2 °F (36.8 °C) SpO2: 91% 90% 94% 90% Weight:      
Height:      
   
 
LAB DATA: 
 
Recent Labs  
  02/17/19 
0501 02/16/19 
0247 02/15/19 
0420 02/15/19 
1508  141  --  141  
K 3.9 4.2  --  3.8 * 107  --  109* CO2 25 29  --  26 AGAP 7 5*  --  6*  
* 109*  --  118* BUN 11 10  --  11  
CREA 0.77* 0.91  --  0.75* GFRAA >60 >60  --  >60  
GFRNA >60 >60  --  >60  
CA 8.5 8.5  --  8.2* MG 1.9 2.1 2.1 2.0 Recent Labs  
  02/17/19 
0501 02/16/19 
0247 02/15/19 
6845 WBC 5.3 5.8 5.9 HGB 14.8 14.9 14.6 HCT 46.1 46.7 46.1  173 135* OBJECTIVE: On arrival to room, I found patient to be resting comfortably in bed at this time. Pain Assessment Pain Intensity 1: 0 (02/16/19 2001) Pain Location 1: Head 
Pain Intervention(s) 1: Medication (see MAR) Patient Stated Pain Goal: 0 
 
  
  
  
  
 
  
  
  
   
 
ASSESSMENT:  Patient's L side weaker than right and L sided facial droop present. Patient is alert and oriented to person. Family and primary RN at bedside and voice no concerns. PLAN:  Will continue to round per outreach protocol.

## 2019-02-17 NOTE — PROGRESS NOTES
Problem: Falls - Risk of 
Goal: *Absence of Falls Document Suzi Ynes Fall Risk and appropriate interventions in the flowsheet. Outcome: Progressing Towards Goal 
Fall Risk Interventions: 
Mobility Interventions: Bed/chair exit alarm, Communicate number of staff needed for ambulation/transfer, Patient to call before getting OOB Medication Interventions: Bed/chair exit alarm, Patient to call before getting OOB, Teach patient to arise slowly Elimination Interventions: Call light in reach, Bed/chair exit alarm, Patient to call for help with toileting needs Problem: Pressure Injury - Risk of 
Goal: *Prevention of pressure injury Document Oral Scale and appropriate interventions in the flowsheet. Outcome: Progressing Towards Goal 
Pressure Injury Interventions: 
  
 
Moisture Interventions: Absorbent underpads, Apply protective barrier, creams and emollients, Check for incontinence Q2 hours and as needed Activity Interventions: Increase time out of bed, PT/OT evaluation, Pressure redistribution bed/mattress(bed type) Mobility Interventions: HOB 30 degrees or less, Pressure redistribution bed/mattress (bed type), PT/OT evaluation Nutrition Interventions: Document food/fluid/supplement intake, Offer support with meals,snacks and hydration Friction and Shear Interventions: Apply protective barrier, creams and emollients, HOB 30 degrees or less, Minimize layers

## 2019-02-17 NOTE — PROGRESS NOTES
Progress Note Patient: Gabino Boone MRN: [id number]  SSN: [id number] YOB: 1950  Age: 76 y.o. Sex: male Admit Date: 2/14/2019 LOS: 3 days Subjective: Pt with no acute neuro changes. aox3 this am. LUE weaker compared to R, but still intact. No complaints this am. Wife at bedside and updated on plan of care. Current Facility-Administered Medications Medication Dose Route Frequency  rivaroxaban (XARELTO) tablet 20 mg  20 mg Oral DAILY WITH BREAKFAST  insulin lispro (HUMALOG) injection   SubCUTAneous AC&HS  aspirin chewable tablet 81 mg  81 mg Oral DAILY  tuberculin injection 5 Units  5 Units IntraDERMal ONCE  
 docusate sodium (COLACE) capsule 100 mg  100 mg Oral BID  
 bisacodyl (DULCOLAX) tablet 5 mg  5 mg Oral DAILY PRN  
 heparin (PF) 2 units/ml in NS infusion 2,000 Units  1,000 mL Irrigation Multiple  HYDROcodone-acetaminophen (NORCO) 7.5-325 mg per tablet 1 Tab  1 Tab Oral Q4H PRN  
 ondansetron (ZOFRAN) injection 4 mg  4 mg IntraVENous Q4H PRN  
 lisinopril-hydroCHLOROthiazide (PRINZIDE, ZESTORETIC) 20-12.5 mg per tablet 1 Tab  1 Tab Oral DAILY  rosuvastatin (CRESTOR) tablet 40 mg  40 mg Oral QHS  sertraline (ZOLOFT) tablet 100 mg  100 mg Oral QPM  
 
 
Objective:  
 
Vitals:  
 02/16/19 1857 02/16/19 2015 02/16/19 2326 02/17/19 0421 BP:  133/87 143/67 141/90 Pulse:  (!) 55 74 71 Resp:  20 18 20 Temp:  99.5 °F (37.5 °C) 97 °F (36.1 °C) 97.6 °F (36.4 °C) SpO2: 98% 92% 96% 92% Weight:      
Height:      
  
  
Intake and Output: 
Current Shift: No intake/output data recorded. Last 24 hr: No intake/output data recorded. Physical Exam:  
General:  Alert, cooperative, no distress, Eyes:  . PERRL, EOMs intact. Neck: Supple, symmetrical, trachea midline, no adenopathy, thyroid: no enlargment/tenderness/nodules, no carotid bruit and no JVD. Lungs:   Clear to auscultation bilaterally. Heart:  Irregular, no mgr. Abdomen:   Soft, non-tender. Bowel sounds normal. No masses,  No organomegaly. Extremities: Extremities normal, atraumatic, no cyanosis or edema. Pulses: 2+ and symmetric all extremities. Skin: Skin color, texture, turgor normal. No rashes or lesions Neurologic: Pt is aox3, follows commands and rodriguez. R >L. .  
 
 
Lab/Data Review: 
 
Recent Results (from the past 12 hour(s)) GLUCOSE, POC Collection Time: 02/16/19  9:06 PM  
Result Value Ref Range Glucose (POC) 125 (H) 65 - 100 mg/dL PTT Collection Time: 02/17/19  5:01 AM  
Result Value Ref Range aPTT 27.4 24.7 - 39.8 SEC MAGNESIUM Collection Time: 02/17/19  5:01 AM  
Result Value Ref Range Magnesium 1.9 1.8 - 2.4 mg/dL CBC W/O DIFF Collection Time: 02/17/19  5:01 AM  
Result Value Ref Range WBC 5.3 4.3 - 11.1 K/uL  
 RBC 5.51 4.23 - 5.6 M/uL  
 HGB 14.8 13.6 - 17.2 g/dL HCT 46.1 41.1 - 50.3 % MCV 83.7 79.6 - 97.8 FL  
 MCH 26.9 26.1 - 32.9 PG  
 MCHC 32.1 31.4 - 35.0 g/dL  
 RDW 13.8 11.9 - 14.6 % PLATELET 059 268 - 988 K/uL MPV 10.7 9.4 - 12.3 FL ABSOLUTE NRBC 0.00 0.0 - 0.2 K/uL METABOLIC PANEL, BASIC Collection Time: 02/17/19  5:01 AM  
Result Value Ref Range Sodium 140 136 - 145 mmol/L Potassium 3.9 3.5 - 5.1 mmol/L Chloride 108 (H) 98 - 107 mmol/L  
 CO2 25 21 - 32 mmol/L Anion gap 7 7 - 16 mmol/L Glucose 119 (H) 65 - 100 mg/dL BUN 11 8 - 23 MG/DL Creatinine 0.77 (L) 0.8 - 1.5 MG/DL  
 GFR est AA >60 >60 ml/min/1.73m2 GFR est non-AA >60 >60 ml/min/1.73m2 Calcium 8.5 8.3 - 10.4 MG/DL Assessment/ Plan:  
 
Principal Problem: 
  Cerebral infarction due to occlusion of right middle cerebral artery ([facility] [address] (2/14/2019) Active Problems: 
  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  Will start Xarelto today and repeat head ct in am. 
 2. Continue PT/OT/ST 3. Discuss rehab plan with pt and rehab team in am: rehab asap is head CT stable in am.  
 
 
Signed By: Mallory Rodas NP February 17, 2019

## 2019-02-18 ENCOUNTER — APPOINTMENT (OUTPATIENT)
Dept: CT IMAGING | Age: 69
DRG: 023 | End: 2019-02-18
Attending: NURSE PRACTITIONER
Payer: MEDICARE

## 2019-02-18 LAB
ANION GAP SERPL CALC-SCNC: 4 MMOL/L (ref 7–16)
APTT PPP: 29.7 SEC (ref 24.7–39.8)
BUN SERPL-MCNC: 14 MG/DL (ref 8–23)
CALCIUM SERPL-MCNC: 8.7 MG/DL (ref 8.3–10.4)
CHLORIDE SERPL-SCNC: 108 MMOL/L (ref 98–107)
CO2 SERPL-SCNC: 28 MMOL/L (ref 21–32)
CREAT SERPL-MCNC: 0.98 MG/DL (ref 0.8–1.5)
ERYTHROCYTE [DISTWIDTH] IN BLOOD BY AUTOMATED COUNT: 14 % (ref 11.9–14.6)
GLUCOSE BLD STRIP.AUTO-MCNC: 108 MG/DL (ref 65–100)
GLUCOSE BLD STRIP.AUTO-MCNC: 116 MG/DL (ref 65–100)
GLUCOSE BLD STRIP.AUTO-MCNC: 118 MG/DL (ref 65–100)
GLUCOSE BLD STRIP.AUTO-MCNC: 134 MG/DL (ref 65–100)
GLUCOSE BLD STRIP.AUTO-MCNC: 146 MG/DL (ref 65–100)
GLUCOSE SERPL-MCNC: 121 MG/DL (ref 65–100)
HCT VFR BLD AUTO: 48.1 % (ref 41.1–50.3)
HGB BLD-MCNC: 15.2 G/DL (ref 13.6–17.2)
MAGNESIUM SERPL-MCNC: 2.1 MG/DL (ref 1.8–2.4)
MCH RBC QN AUTO: 26.9 PG (ref 26.1–32.9)
MCHC RBC AUTO-ENTMCNC: 31.6 G/DL (ref 31.4–35)
MCV RBC AUTO: 85.1 FL (ref 79.6–97.8)
NRBC # BLD: 0 K/UL (ref 0–0.2)
PLATELET # BLD AUTO: 218 K/UL (ref 150–450)
PMV BLD AUTO: 10.7 FL (ref 9.4–12.3)
POTASSIUM SERPL-SCNC: 4.6 MMOL/L (ref 3.5–5.1)
RBC # BLD AUTO: 5.65 M/UL (ref 4.23–5.6)
SODIUM SERPL-SCNC: 140 MMOL/L (ref 136–145)
WBC # BLD AUTO: 5.3 K/UL (ref 4.3–11.1)

## 2019-02-18 PROCEDURE — 85730 THROMBOPLASTIN TIME PARTIAL: CPT

## 2019-02-18 PROCEDURE — 70450 CT HEAD/BRAIN W/O DYE: CPT

## 2019-02-18 PROCEDURE — 83735 ASSAY OF MAGNESIUM: CPT

## 2019-02-18 PROCEDURE — 97530 THERAPEUTIC ACTIVITIES: CPT

## 2019-02-18 PROCEDURE — 82962 GLUCOSE BLOOD TEST: CPT

## 2019-02-18 PROCEDURE — 85027 COMPLETE CBC AUTOMATED: CPT

## 2019-02-18 PROCEDURE — 36415 COLL VENOUS BLD VENIPUNCTURE: CPT

## 2019-02-18 PROCEDURE — 74011250637 HC RX REV CODE- 250/637: Performed by: NEUROLOGICAL SURGERY

## 2019-02-18 PROCEDURE — 97110 THERAPEUTIC EXERCISES: CPT

## 2019-02-18 PROCEDURE — 97535 SELF CARE MNGMENT TRAINING: CPT

## 2019-02-18 PROCEDURE — 80048 BASIC METABOLIC PNL TOTAL CA: CPT

## 2019-02-18 PROCEDURE — 99231 SBSQ HOSP IP/OBS SF/LOW 25: CPT | Performed by: NEUROLOGICAL SURGERY

## 2019-02-18 PROCEDURE — 74011000302 HC RX REV CODE- 302: Performed by: NEUROLOGICAL SURGERY

## 2019-02-18 PROCEDURE — 65660000000 HC RM CCU STEPDOWN

## 2019-02-18 PROCEDURE — 86580 TB INTRADERMAL TEST: CPT | Performed by: NEUROLOGICAL SURGERY

## 2019-02-18 PROCEDURE — 74011250637 HC RX REV CODE- 250/637: Performed by: NURSE PRACTITIONER

## 2019-02-18 PROCEDURE — 99232 SBSQ HOSP IP/OBS MODERATE 35: CPT | Performed by: PHYSICAL MEDICINE & REHABILITATION

## 2019-02-18 RX ADMIN — TUBERCULIN PURIFIED PROTEIN DERIVATIVE 5 UNITS: 5 INJECTION, SOLUTION INTRADERMAL at 08:26

## 2019-02-18 RX ADMIN — SERTRALINE 100 MG: 100 TABLET, FILM COATED ORAL at 16:53

## 2019-02-18 RX ADMIN — DOCUSATE SODIUM 100 MG: 100 CAPSULE, LIQUID FILLED ORAL at 16:53

## 2019-02-18 RX ADMIN — ROSUVASTATIN CALCIUM 40 MG: 20 TABLET, FILM COATED ORAL at 21:51

## 2019-02-18 RX ADMIN — DOCUSATE SODIUM 100 MG: 100 CAPSULE, LIQUID FILLED ORAL at 08:26

## 2019-02-18 RX ADMIN — ASPIRIN 81 MG 81 MG: 81 TABLET ORAL at 08:26

## 2019-02-18 RX ADMIN — RIVAROXABAN 20 MG: 20 TABLET, FILM COATED ORAL at 08:26

## 2019-02-18 RX ADMIN — LISINOPRIL AND HYDROCHLOROTHIAZIDE 1 TABLET: 12.5; 2 TABLET ORAL at 08:25

## 2019-02-18 NOTE — PROGRESS NOTES
Problem: Falls - Risk of 
Goal: *Absence of Falls Document [place] Donna Fall Risk and appropriate interventions in the flowsheet. Outcome: Progressing Towards Goal 
Fall Risk Interventions: 
Mobility Interventions: Bed/chair exit alarm, Communicate number of staff needed for ambulation/transfer, Patient to call before getting OOB Medication Interventions: Bed/chair exit alarm, Patient to call before getting OOB, Teach patient to arise slowly Elimination Interventions: Call light in reach, Bed/chair exit alarm, Patient to call for help with toileting needs Problem: Pressure Injury - Risk of 
Goal: *Prevention of pressure injury Document Oral Scale and appropriate interventions in the flowsheet. Outcome: Progressing Towards Goal 
Pressure Injury Interventions: 
  
 
Moisture Interventions: Absorbent underpads Activity Interventions: Increase time out of bed, Pressure redistribution bed/mattress(bed type), PT/OT evaluation Mobility Interventions: HOB 30 degrees or less, Pressure redistribution bed/mattress (bed type), PT/OT evaluation Nutrition Interventions: Document food/fluid/supplement intake, Offer support with meals,snacks and hydration Friction and Shear Interventions: Apply protective barrier, creams and emollients, Minimize layers

## 2019-02-18 NOTE — PROGRESS NOTES
Problem: Interdisciplinary Rounds Goal: Interdisciplinary Rounds Outcome: Progressing Towards Goal 
Interdisciplinary team rounds were held 2/18/2019 with the following team members:Care Management, Physical Therapy, Physician and Floor Charge RN, Supervisor and Director. Anticipate discharge to [facility] pending insurance approval. 
 
Plan of care discussed. See clinical pathway and/or care plan for interventions and desired outcomes.

## 2019-02-18 NOTE — PROGRESS NOTES
Progress Note Patient: Lamin Sellers MRN: [id number]  SSN: [id number] YOB: 1950  Age: 76 y.o. Sex: male Admit Date: 2/14/2019 LOS: 4 days Subjective:  
Pt aox3 this am. Wife at bedside. Pt continues to have mild left facial droop. Intermittent confusion. LUE with on droop but is weaker compared to R. Current Facility-Administered Medications Medication Dose Route Frequency  tuberculin injection 5 Units  5 Units IntraDERMal ONCE  
 rivaroxaban (XARELTO) tablet 20 mg  20 mg Oral DAILY WITH BREAKFAST  insulin lispro (HUMALOG) injection   SubCUTAneous AC&HS  
 docusate sodium (COLACE) capsule 100 mg  100 mg Oral BID  
 bisacodyl (DULCOLAX) tablet 5 mg  5 mg Oral DAILY PRN  
 HYDROcodone-acetaminophen (NORCO) 7.5-325 mg per tablet 1 Tab  1 Tab Oral Q4H PRN  
 ondansetron (ZOFRAN) injection 4 mg  4 mg IntraVENous Q4H PRN  
 lisinopril-hydroCHLOROthiazide (PRINZIDE, ZESTORETIC) 20-12.5 mg per tablet 1 Tab  1 Tab Oral DAILY  rosuvastatin (CRESTOR) tablet 40 mg  40 mg Oral QHS  sertraline (ZOLOFT) tablet 100 mg  100 mg Oral QPM  
 
 
Objective:  
 
Vitals:  
 02/18/19 0000 02/18/19 0400 02/18/19 0800 02/18/19 1200 BP: 121/80 121/71 121/82 95/71 Pulse: 73 75 80 78 Resp: 18 18 17 17 Temp: 99.4 °F (37.4 °C) 98.5 °F (36.9 °C) 98.3 °F (36.8 °C) 97.9 °F (36.6 °C) SpO2: 95% 98% 90% 95% Weight:      
Height:      
  
  
Intake and Output: 
Current Shift: 02/18 0701 - 02/18 1900 In: 5 [P.O.:420] Out: - Last 24 hr: No intake/output data recorded. Physical Exam:  
General:  Alert, cooperative, no distress. Eyes:  PERRL+3 Neck: Supple, symmetrical, trachea midline, no adenopathy, thyroid: no enlargment/tenderness/nodules, no carotid bruit and no JVD. Lungs:   Clear to auscultation bilaterally. Heart:  Irregular, no MGR Abdomen:   Soft, non-tender. Bowel sounds normal. No masses,  No organomegaly. Extremities: Extremities normal, atraumatic, no cyanosis or edema. LUE 4/5 compared to R 5/5 Pulses: 2+ and symmetric all extremities. Skin: Skin color, texture, turgor normal. No rashes or lesions Neurologic: Pt alert and following commands. Speech clear with mild slurring, rodriguez. .  
 
 
Lab/Data Review: 
 
Recent Results (from the past 12 hour(s)) PTT Collection Time: 02/18/19  4:48 AM  
Result Value Ref Range aPTT 29.7 24.7 - 39.8 SEC MAGNESIUM Collection Time: 02/18/19  4:48 AM  
Result Value Ref Range Magnesium 2.1 1.8 - 2.4 mg/dL CBC W/O DIFF Collection Time: 02/18/19  4:48 AM  
Result Value Ref Range WBC 5.3 4.3 - 11.1 K/uL  
 RBC 5.65 (H) 4.23 - 5.6 M/uL  
 HGB 15.2 13.6 - 17.2 g/dL HCT 48.1 41.1 - 50.3 % MCV 85.1 79.6 - 97.8 FL  
 MCH 26.9 26.1 - 32.9 PG  
 MCHC 31.6 31.4 - 35.0 g/dL  
 RDW 14.0 11.9 - 14.6 % PLATELET 658 489 - 902 K/uL MPV 10.7 9.4 - 12.3 FL ABSOLUTE NRBC 0.00 0.0 - 0.2 K/uL METABOLIC PANEL, BASIC Collection Time: 02/18/19  4:48 AM  
Result Value Ref Range Sodium 140 136 - 145 mmol/L Potassium 4.6 3.5 - 5.1 mmol/L Chloride 108 (H) 98 - 107 mmol/L  
 CO2 28 21 - 32 mmol/L Anion gap 4 (L) 7 - 16 mmol/L Glucose 121 (H) 65 - 100 mg/dL BUN 14 8 - 23 MG/DL Creatinine 0.98 0.8 - 1.5 MG/DL  
 GFR est AA >60 >60 ml/min/1.73m2 GFR est non-AA >60 >60 ml/min/1.73m2 Calcium 8.7 8.3 - 10.4 MG/DL  
GLUCOSE, POC Collection Time: 02/18/19  7:00 AM  
Result Value Ref Range Glucose (POC) 118 (H) 65 - 100 mg/dL GLUCOSE, POC Collection Time: 02/18/19 10:52 AM  
Result Value Ref Range Glucose (POC) 134 (H) 65 - 100 mg/dL Assessment/ Plan:  
 
Principal Problem: 
  Cerebral infarction due to occlusion of right middle cerebral artery ([address] (2/14/2019) Active Problems: 
  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 [address] (2/15/2019) 1. Pt pending insurance approval for 9th floor rehab 2. Continue PT/OT/ST 3. Repeat HCT showed right temporal ischemic changes that are more evident now but were present before. Minimal if any acute blood noted. Continue xarelto 20mg daily. Signed By: Fabiana Hernandez NP February 18, 2019

## 2019-02-18 NOTE — PROGRESS NOTES
Date of Outreach Update: 
Cliff Hassan was seen and assessed. MEWS Score: 1 (02/18/19 0800) Vitals:  
 02/17/19 2000 02/18/19 0000 02/18/19 0400 02/18/19 0800 BP: 116/81 121/80 121/71 121/82 Pulse: 85 73 75 80 Resp: 18 18 18 17 Temp: 99.2 °F (37.3 °C) 99.4 °F (37.4 °C) 98.5 °F (36.9 °C) 98.3 °F (36.8 °C) SpO2: 92% 95% 98% 90% Weight:      
Height:      
  
 
 Pain Assessment Pain Intensity 1: 0 (02/18/19 1100) Pain Location 1: Head 
Pain Intervention(s) 1: Medication (see MAR) Patient Stated Pain Goal: 0 Previous Outreach assessment has been reviewed. There have been no significant clinical changes since the completion of the last dated Outreach assessment. Pt resting in chair, family at bedside. Pt using exercise band. Pt alert, answering questions appropriately. Denies any pain/sob at this time. Will discharge from outreach program.  
 
Signed By:   Evan Pemberton RN   February 18, 2019 12:15 PM

## 2019-02-18 NOTE — PROGRESS NOTES
CM spoke with NP Diana Singer, who informed that plan for discharge is [facility] vs HH pending patient's decision. Consult for discharge planning noted. CM then met with patient and wife in room to discuss discharge planning. Patient initially stated his preference is to return home when medically ready for discharge. CM spent time educating patient and spouse about services provided at [facility] vs [facility]. Patient and wife then expressed they are agreeable to discharge plan to [facility] when medically ready. CM contacted [facility] liaison, to inform of patient's decision, and request precert start on this date. Currently awaiting PT/OT documentation from today's date to begin precert. DC PLAN is [facility] pending insurance authorization.

## 2019-02-18 NOTE — PROGRESS NOTES
Problem: Falls - Risk of 
Goal: *Absence of Falls Document Kat Maurice Fall Risk and appropriate interventions in the flowsheet. Outcome: Progressing Towards Goal 
Fall Risk Interventions: 
Mobility Interventions: Bed/chair exit alarm, OT consult for ADLs, PT Consult for mobility concerns, Patient to call before getting OOB, PT Consult for assist device competence, Strengthening exercises (ROM-active/passive), Utilize walker, cane, or other assistive device Medication Interventions: Assess postural VS orthostatic hypotension, Bed/chair exit alarm, Patient to call before getting OOB, Teach patient to arise slowly Elimination Interventions: Bed/chair exit alarm, Call light in reach, Patient to call for help with toileting needs, Toilet paper/wipes in reach Problem: Pressure Injury - Risk of 
Goal: *Prevention of pressure injury Document Oral Scale and appropriate interventions in the flowsheet. Outcome: Progressing Towards Goal 
Pressure Injury Interventions: 
  
 
Moisture Interventions: Apply protective barrier, creams and emollients, Absorbent underpads, Limit adult briefs, Maintain skin hydration (lotion/cream), Minimize layers Activity Interventions: Increase time out of bed, Pressure redistribution bed/mattress(bed type), PT/OT evaluation Mobility Interventions: HOB 30 degrees or less, Pressure redistribution bed/mattress (bed type), PT/OT evaluation Nutrition Interventions: Document food/fluid/supplement intake Friction and Shear Interventions: Apply protective barrier, creams and emollients, Minimize layers

## 2019-02-18 NOTE — PROGRESS NOTES
Problem: Self Care Deficits Care Plan (Adult) Goal: *Acute Goals and Plan of Care (Insert Text) 1. Pt will toilet with SBA 2. Pt will complete functional mobility for ADLs with SBA 3. Pt will complete lower body dressing with SBA using AE as needed 4. Pt will complete grooming and hygiene at sink with SBA 5. Pt will demonstrate independence with HEP to promote increased LUE strength, coordination,and functional use for ADLs 6. Pt will complete UB bathing and dressing with SBA using adaptive techniques as needed 7. Pt will complete bed mobility with SBA in prep for ADLs Timeframe: 7 days OCCUPATIONAL THERAPY: Daily Note 2/18/2019INPATIENT: OT Visit Days: 2 Payor: [employer]ICARE / Plan: [employer] [address] / Product Type: [employer] Medicare /  
  
NAME/AGE/GENDER: Abeba Laguerre is a 76 y.o. male PRIMARY DIAGNOSIS:  Acute right arterial ischemic stroke, MCA (middle cerebral artery) ([facility] [address] [I63.511] Cerebral infarction due to occlusion of right middle cerebral artery (HCC) Cerebral infarction due to occlusion of right middle cerebral artery ([facility] [address] ICD-10: Treatment Diagnosis:  
 · Hemiplegia and hemiparesis following cerebral infarction affecting left non-dominant side (U29.269) Precautions/Allergies: 
  falls Niacin ASSESSMENT:  
Mr. Nilda Swan was admitted with L side weakness, facial droop, and slurred speech. MRI + R MCA CVA, s/p thrombectomy. Pt lives alone and is independent and drives at baseline, occasionally uses a cane for mobility. Pt's estranged wife arrived at end of session and stated that she will be able to assist pt post d/c. This session, pt presented supine in bed, pleasant and agreeable to OT session. Pt A&O to all but time, alert with decreased attention and slow processing. Pt required min cues for attention, to proceed with tasks, min cues for problem solving and redirection.  Pt transferred to edge of bed with SBA, stood with CGA and completed mobility around room several times and to/ from sink with CGA w/o AD. Pt completed hygiene with CGA with extra time and min cues while standing at sink, encouraged to use LUE for strengthening and functional return. Pt demonstrated improved LUE strength but continues to be limited by < strength and coordination, issued yellow theraband and HEP and completed exercises in chart below with mod cues for carryover and to complete correctly and move through full ROM. Pt is progressing towards goals, continue POC. This section established at most recent assessment PROBLEM LIST (Impairments causing functional limitations): 1. Decreased Strength 2. Decreased ADL/Functional Activities 3. Decreased Transfer Abilities 4. Decreased Balance 5. Decreased Cognition INTERVENTIONS PLANNED: (Benefits and precautions of occupational therapy have been discussed with the patient.) 1. Activities of daily living training 2. Adaptive equipment training 3. Balance training 4. Clothing management 5. Cognitive training 6. Donning&doffing training 7. Therapeutic activity 8. Therapeutic exercise TREATMENT PLAN: Frequency/Duration: Follow patient 3 times/ week to address above goals. Rehabilitation Potential For Stated Goals: Good RECOMMENDED REHABILITATION/EQUIPMENT: (at time of discharge pending progress): Due to the probability of continued deficits (see above) this patient will likely need continued skilled occupational therapy after discharge. Equipment:  
? 3:1 [facility] [place] OCCUPATIONAL PROFILE AND HISTORY:  
History of Present Injury/Illness (Reason for Referral): 
See H&P Past Medical History/Comorbidities:  
Mr. Yehuda Shelton  has no past medical history on file. Mr. Yehuda Shelton  has a past surgical history that includes ir perc art mech thromb infusion intracranial (2/14/2019). Social History/Living Environment:  
Home Environment: Private residence One/Two Story Residence: One story Living Alone:  Yes 
 Support Systems: [employer] / micky community, Family member(s), Friends \ neighbors Patient Expects to be Discharged to[de-identified] Private residence Current DME Used/Available at Home: Cane, straight Tub or Shower Type: Tub/Shower combination Prior Level of Function/Work/Activity: 
Independent, lives alone, occasionally uses a cane Number of Personal Factors/Comorbidities that affect the Plan of Care: Expanded review of therapy/medical records (1-2):  MODERATE COMPLEXITY ASSESSMENT OF OCCUPATIONAL PERFORMANCE[de-identified]  
Activities of Daily Living:  
Basic ADLs (From Assessment) Complex ADLs (From Assessment) Feeding: Setup Oral Facial Hygiene/Grooming: Minimum assistance Bathing: Minimum assistance Upper Body Dressing: Minimum assistance Lower Body Dressing: Moderate assistance Toileting: Minimum assistance Instrumental ADL Meal Preparation: Maximum assistance Homemaking: Maximum assistance Grooming/Bathing/Dressing Activities of Daily Living Grooming Grooming Assistance: Contact guard assistance Cognitive Retraining Safety/Judgement: Decreased insight into deficits Bed/Mat Mobility Supine to Sit: Stand-by assistance Sit to Stand: Contact guard assistance Bed to Chair: Contact guard assistance Most Recent Physical Functioning:  
Gross Assessment: 
AROM: Generally decreased, functional 
PROM: Within functional limits Strength: Generally decreased, functional 
Coordination: Generally decreased, functional 
Tone: Normal 
Sensation: Intact Posture: 
Posture (WDL): Exceptions to [facility] Posture Assessment: Forward head Balance: 
  Bed Mobility: 
Supine to Sit: Stand-by assistance Wheelchair Mobility: 
  
Transfers: 
Sit to Stand: Contact guard assistance Stand to Sit: Contact guard assistance Bed to Chair: Contact guard assistance Patient Vitals for the past 6 hrs: 
 BP BP Patient Position SpO2 Pulse 02/18/19 0800 121/82 At rest 90 % 80 Mental Status Neurologic State: Alert Orientation Level: Oriented to person, Oriented to place, Oriented to situation, Disoriented to time Cognition: Follows commands Perception: Cues to attend left visual field Perseveration: No perseveration noted Safety/Judgement: Decreased insight into deficits Physical Skills Involved: 
1. Balance 2. Strength 3. Activity Tolerance 4. Fine Motor Control 5. Gross Motor Control 6. Vision Cognitive Skills Affected (resulting in the inability to perform in a timely and safe manner): 1. Perception 2. Executive Function 3. Sustained Attention 4. Divided Attention Psychosocial Skills Affected: 1. Habits/Routines 2. Environmental Adaptation Number of elements that affect the Plan of Care: 3-5:  MODERATE COMPLEXITY CLINICAL DECISION MAKIN[address] [place] [id number] AM-PAC 6 Clicks Daily Activity Inpatient Short Form How much help from another person does the patient currently need. .. Total A Lot A Little None 1. Putting on and taking off regular lower body clothing? [] 1   [x] 2   [] 3   [] 4  
2. Bathing (including washing, rinsing, drying)? [] 1   [x] 2   [] 3   [] 4  
3. Toileting, which includes using toilet, bedpan or urinal?   [] 1   [x] 2   [] 3   [] 4  
4. Putting on and taking off regular upper body clothing? [] 1   [] 2   [x] 3   [] 4  
5. Taking care of personal grooming such as brushing teeth? [] 1   [] 2   [x] 3   [] 4  
6. Eating meals? [] 1   [] 2   [x] 3   [] 4  
© , Trustees of [address] [place] [address], under license to [employer] All rights reserved Score:  Initial: 15 Most Recent: X (Date: -- ) Interpretation of Tool:  Represents activities that are increasingly more difficult (i.e. Bed mobility, Transfers, Gait). Medical Necessity:    
· Patient demonstrates good rehab potential due to higher previous functional level. Reason for Services/Other Comments: · Patient continues to require present interventions due to patient's inability to independently complete ADLs. Use of outcome tool(s) and clinical judgement create a POC that gives a: MODERATE COMPLEXITY  
 
 
 
TREATMENT:  
(In addition to Assessment/Re-Assessment sessions the following treatments were rendered) Pre-treatment Symptoms/Complaints:   
Pain: Initial:  
Pain Intensity 1: 0  Post Session:  0 Self Care: (15 min): Procedure(s) (per grid) utilized to improve and/or restore self-care/home management as related to grooming. Required minimal visual, verbal and tactile cueing to facilitate activities of daily living skills. Therapeutic Exercise: (  12 min):  Exercises per grid below to improve strength, coordination and dynamic movement of arm - left to improve functional functional use for ADLs. Required moderate visual, verbal, manual and tactile cues to promote proper body alignment, promote proper body posture and promote proper body mechanics. Progressed resistance, range, repetitions and complexity of movement as indicated. Date: 
2/18/19 Date: 
 Date: Activity/Exercise Parameters Parameters Parameters Shoulder flexion  10/2 Shoulder ab/adduction 10/2 Elbow flexion  10/2 Elbow extension  10/2 Hand flexion/ extension  10/2 Braces/Orthotics/Lines/Etc:  
· O2 Device: Nasal cannula Treatment/Session Assessment:   
· Response to Treatment:  No adverse reaction · Interdisciplinary Collaboration:  
o Occupational Therapist 
o Registered Nurse 
o  · After treatment position/precautions:  
o Up in chair 
o Bed/Chair-wheels locked 
o Bed in low position 
o Call light within reach 
o RN notified 
o Family at bedside · Compliance with Program/Exercises: Compliant most of the time. · Recommendations/Intent for next treatment session:   \"Next visit will focus on advancements to more challenging activities and reduction in assistance provided\". Total Treatment Duration: OT Patient Time In/Time Out Time In: 3819 Time Out: 1111 Zeina Calloway OT

## 2019-02-18 NOTE — PROGRESS NOTES
Problem: Falls - Risk of 
Goal: *Absence of Falls Document Julienne End Fall Risk and appropriate interventions in the flowsheet. Outcome: Progressing Towards Goal 
Fall Risk Interventions: 
Mobility Interventions: Assess mobility with egress test, Bed/chair exit alarm, Communicate number of staff needed for ambulation/transfer, Strengthening exercises (ROM-active/passive), PT Consult for assist device competence, PT Consult for mobility concerns, Patient to call before getting OOB, OT consult for ADLs, Utilize walker, cane, or other assistive device, Utilize gait belt for transfers/ambulation Medication Interventions: Assess postural VS orthostatic hypotension, Bed/chair exit alarm, Evaluate medications/consider consulting pharmacy, Patient to call before getting OOB, Teach patient to arise slowly, Utilize gait belt for transfers/ambulation Elimination Interventions: Bed/chair exit alarm, Elevated toilet seat, Patient to call for help with toileting needs, Toileting schedule/hourly rounds, Toilet paper/wipes in reach, Call light in reach Problem: Pressure Injury - Risk of 
Goal: *Prevention of pressure injury Document Oral Scale and appropriate interventions in the flowsheet. Outcome: Progressing Towards Goal 
Pressure Injury Interventions: 
  
 
Moisture Interventions: Offer toileting Q_hr, Moisture barrier, Limit adult briefs, Minimize layers, Maintain skin hydration (lotion/cream), Contain wound drainage, Check for incontinence Q2 hours and as needed, Assess need for specialty bed, Apply protective barrier, creams and emollients, Absorbent underpads Activity Interventions: Pressure redistribution bed/mattress(bed type), Increase time out of bed, PT/OT evaluation Mobility Interventions: Turn and reposition approx. every two hours(pillow and wedges), HOB 30 degrees or less, Assess need for specialty bed, PT/OT evaluation Nutrition Interventions: Offer support with meals,snacks and hydration, Discuss nutritional consult with provider, Document food/fluid/supplement intake Friction and Shear Interventions: Apply protective barrier, creams and emollients, HOB 30 degrees or less, Lift sheet, Minimize layers

## 2019-02-18 NOTE — PROGRESS NOTES
PM&R Consult Progress Note Patient: Nabeel Lim Admit Date: 2/14/2019 Admit Diagnosis: Acute right arterial ischemic stroke, MCA (middle cerebral artery) ([facility] [address] [I63.511] Recommendations: Continue Acute Rehab Program, Coordination of rehab/medical care, Counseling of PM & R care issues management, Hospital Inpatient Rehab S/p R MCA thrombectomy  
- Recommend IRC for an interdisciplinary team approach. He has executive dysfxn with slow processing and poor safety awareness. He is CGA with an AD for short distant ambulation. Will have PT and OT reassess today and begin [address] for [facility] admission as soon as possible. Decision can take a few days. Cont PT/OT/ST efforts 
-f/u Head CT still showing stroke in evolution with improvement of hemorrhage, Xarelto added, thus monitor closely for neuro decline 
-pt needs closer medical monitoring than a SNF can provide due to afib, anticoagulation in light of recent subcortical right hemispheric hemorrhage. BP mgt History/Subjective/Complaint:  
 
Patient seen and examined. Records reviewed. Says he feels well. No headache, denies visual deficits Pain 1 Pain Scale 1: Numeric (0 - 10) (02/18/19 4988) Pain Intensity 1: 0 (02/18/19 6432) Patient Stated Pain Goal: 0 (02/16/19 0804) Pain Reassessment 1: Patient sleeping (02/15/19 0011) Pain Location 1: Head (02/14/19 2311) Pain Intervention(s) 1: Medication (see MAR) (02/14/19 2311) Objective:  
 
Vitals: 
Patient Vitals for the past 8 hrs: 
 BP Temp Pulse Resp SpO2  
02/18/19 0800 121/82 98.3 °F (36.8 °C) 80 17 90 % 02/18/19 0400 121/71 98.5 °F (36.9 °C) 75 18 98 % Intake and Output: 
No intake/output data recorded. Allergies Allergen Reactions  Niacin Unknown (comments) Current Facility-Administered Medications Medication Dose Route Frequency  tuberculin injection 5 Units  5 Units IntraDERMal ONCE  
 rivaroxaban (XARELTO) tablet 20 mg  20 mg Oral DAILY WITH BREAKFAST  insulin lispro (HUMALOG) injection   SubCUTAneous AC&HS  
 docusate sodium (COLACE) capsule 100 mg  100 mg Oral BID  
 bisacodyl (DULCOLAX) tablet 5 mg  5 mg Oral DAILY PRN  
 HYDROcodone-acetaminophen (NORCO) 7.5-325 mg per tablet 1 Tab  1 Tab Oral Q4H PRN  
 ondansetron (ZOFRAN) injection 4 mg  4 mg IntraVENous Q4H PRN  
 lisinopril-hydroCHLOROthiazide (PRINZIDE, ZESTORETIC) 20-12.5 mg per tablet 1 Tab  1 Tab Oral DAILY  rosuvastatin (CRESTOR) tablet 40 mg  40 mg Oral QHS  sertraline (ZOLOFT) tablet 100 mg  100 mg Oral QPM  
 
 
Physical Exam: AAOx 3, first said he was in [place] but self corrected HEENT; slight left lower facial weakness, speech clear CV rrr no m LUNGS; cta b 
ABD soft ntnd bs + EXT no edema NEURO; follows one step commands well. Did require VCs for 2 step at times. Immediate recall 3/3, after 3 min 1/3 (3/3 with cues) Mild left upper ext weakness with mild drift. No dysmetria but did have a bit of inconsistent difficulty with FTN on the left Functional Assessment: 
Gross Assessment AROM: Generally decreased, functional (02/15/19 1400) PROM: Within functional limits (02/15/19 1400) Strength: Generally decreased, functional (02/15/19 1400) Coordination: Generally decreased, functional (02/15/19 1400) Tone: Normal (02/15/19 1400) Sensation: Intact (02/15/19 1400) Gait Base of Support: Center of gravity altered; Widened (02/15/19 1400) Speed/Jaye: Slow;Shuffled (02/15/19 1400) Gait Abnormalities: Decreased step clearance; Path deviations;Trunk sway increased (02/15/19 1400) Ambulation - Level of Assistance: Contact guard assistance (02/15/19 1400) Distance (ft): 3 Feet (ft)(side steps at edge of bed) (02/15/19 1400) Assistive Device: Walker, rolling (02/15/19 1400) Interventions: Manual cues; Safety awareness training;Verbal cues (02/15/19 1400) Bed Mobility Supine to Sit: Minimum assistance (02/15/19 1400) Sit to Supine: Contact guard assistance (02/15/19 1400) Scooting: Contact guard assistance (02/15/19 1400) Balance Sitting: Impaired (02/15/19 1400) Sitting - Static: Good (unsupported) (02/15/19 1400) Sitting - Dynamic: Fair (occasional) (02/15/19 1400) Standing: Impaired (02/15/19 1400) Standing - Static: Fair (02/15/19 1400) Standing - Dynamic : Fair (02/15/19 1400) Grooming Washing Face: Supervision/set-up (02/15/19 1100) Bed/Mat Mobility Supine to Sit: Minimum assistance (02/15/19 1400) Sit to Supine: Contact guard assistance (02/15/19 1400) Sit to Stand: Contact guard assistance (02/15/19 1400) Bed to Chair: Minimum assistance (02/15/19 1100) Scooting: Contact guard assistance (02/15/19 1400) Labs/Studies: 
Recent Results (from the past 72 hour(s)) GLUCOSE, POC Collection Time: 02/15/19 11:30 AM  
Result Value Ref Range Glucose (POC) 121 (H) 65 - 100 mg/dL GLUCOSE, POC Collection Time: 02/15/19  4:26 PM  
Result Value Ref Range Glucose (POC) 120 (H) 65 - 100 mg/dL GLUCOSE, POC Collection Time: 02/15/19 11:39 PM  
Result Value Ref Range Glucose (POC) 140 (H) 65 - 100 mg/dL CBC W/O DIFF Collection Time: 02/16/19  2:47 AM  
Result Value Ref Range WBC 5.8 4.3 - 11.1 K/uL  
 RBC 5.51 4.23 - 5.6 M/uL  
 HGB 14.9 13.6 - 17.2 g/dL HCT 46.7 41.1 - 50.3 % MCV 84.8 79.6 - 97.8 FL  
 MCH 27.0 26.1 - 32.9 PG  
 MCHC 31.9 31.4 - 35.0 g/dL  
 RDW 14.3 11.9 - 14.6 % PLATELET 568 728 - 120 K/uL MPV 10.6 9.4 - 12.3 FL ABSOLUTE NRBC 0.00 0.0 - 0.2 K/uL PTT Collection Time: 02/16/19  2:47 AM  
Result Value Ref Range aPTT 28.2 24.7 - 39.8 SEC MAGNESIUM Collection Time: 02/16/19  2:47 AM  
Result Value Ref Range Magnesium 2.1 1.8 - 2.4 mg/dL METABOLIC PANEL, BASIC Collection Time: 02/16/19  2:47 AM  
Result Value Ref Range Sodium 141 136 - 145 mmol/L Potassium 4.2 3.5 - 5.1 mmol/L  Chloride 107 98 - 107 mmol/L  
 CO2 29 21 - 32 mmol/L Anion gap 5 (L) 7 - 16 mmol/L Glucose 109 (H) 65 - 100 mg/dL BUN 10 8 - 23 MG/DL Creatinine 0.91 0.8 - 1.5 MG/DL  
 GFR est AA >60 >60 ml/min/1.73m2 GFR est non-AA >60 >60 ml/min/1.73m2 Calcium 8.5 8.3 - 10.4 MG/DL  
GLUCOSE, POC Collection Time: 02/16/19  5:52 AM  
Result Value Ref Range Glucose (POC) 140 (H) 65 - 100 mg/dL GLUCOSE, POC Collection Time: 02/16/19 12:17 PM  
Result Value Ref Range Glucose (POC) 95 65 - 100 mg/dL GLUCOSE, POC Collection Time: 02/16/19  9:06 PM  
Result Value Ref Range Glucose (POC) 125 (H) 65 - 100 mg/dL PTT Collection Time: 02/17/19  5:01 AM  
Result Value Ref Range aPTT 27.4 24.7 - 39.8 SEC MAGNESIUM Collection Time: 02/17/19  5:01 AM  
Result Value Ref Range Magnesium 1.9 1.8 - 2.4 mg/dL CBC W/O DIFF Collection Time: 02/17/19  5:01 AM  
Result Value Ref Range WBC 5.3 4.3 - 11.1 K/uL  
 RBC 5.51 4.23 - 5.6 M/uL  
 HGB 14.8 13.6 - 17.2 g/dL HCT 46.1 41.1 - 50.3 % MCV 83.7 79.6 - 97.8 FL  
 MCH 26.9 26.1 - 32.9 PG  
 MCHC 32.1 31.4 - 35.0 g/dL  
 RDW 13.8 11.9 - 14.6 % PLATELET 346 858 - 732 K/uL MPV 10.7 9.4 - 12.3 FL ABSOLUTE NRBC 0.00 0.0 - 0.2 K/uL METABOLIC PANEL, BASIC Collection Time: 02/17/19  5:01 AM  
Result Value Ref Range Sodium 140 136 - 145 mmol/L Potassium 3.9 3.5 - 5.1 mmol/L Chloride 108 (H) 98 - 107 mmol/L  
 CO2 25 21 - 32 mmol/L Anion gap 7 7 - 16 mmol/L Glucose 119 (H) 65 - 100 mg/dL BUN 11 8 - 23 MG/DL Creatinine 0.77 (L) 0.8 - 1.5 MG/DL  
 GFR est AA >60 >60 ml/min/1.73m2 GFR est non-AA >60 >60 ml/min/1.73m2 Calcium 8.5 8.3 - 10.4 MG/DL  
GLUCOSE, POC Collection Time: 02/17/19  7:22 AM  
Result Value Ref Range Glucose (POC) 102 (H) 65 - 100 mg/dL GLUCOSE, POC Collection Time: 02/17/19 11:42 AM  
Result Value Ref Range Glucose (POC) 156 (H) 65 - 100 mg/dL GLUCOSE, POC  Collection Time: 02/17/19  2:30 PM  
 Result Value Ref Range Glucose (POC) 100 65 - 100 mg/dL GLUCOSE, POC Collection Time: 02/17/19  8:50 PM  
Result Value Ref Range Glucose (POC) 102 (H) 65 - 100 mg/dL GLUCOSE, POC Collection Time: 02/18/19 12:16 AM  
Result Value Ref Range Glucose (POC) 108 (H) 65 - 100 mg/dL PTT Collection Time: 02/18/19  4:48 AM  
Result Value Ref Range aPTT 29.7 24.7 - 39.8 SEC MAGNESIUM Collection Time: 02/18/19  4:48 AM  
Result Value Ref Range Magnesium 2.1 1.8 - 2.4 mg/dL CBC W/O DIFF Collection Time: 02/18/19  4:48 AM  
Result Value Ref Range WBC 5.3 4.3 - 11.1 K/uL  
 RBC 5.65 (H) 4.23 - 5.6 M/uL  
 HGB 15.2 13.6 - 17.2 g/dL HCT 48.1 41.1 - 50.3 % MCV 85.1 79.6 - 97.8 FL  
 MCH 26.9 26.1 - 32.9 PG  
 MCHC 31.6 31.4 - 35.0 g/dL  
 RDW 14.0 11.9 - 14.6 % PLATELET 622 010 - 224 K/uL MPV 10.7 9.4 - 12.3 FL ABSOLUTE NRBC 0.00 0.0 - 0.2 K/uL METABOLIC PANEL, BASIC Collection Time: 02/18/19  4:48 AM  
Result Value Ref Range Sodium 140 136 - 145 mmol/L Potassium 4.6 3.5 - 5.1 mmol/L Chloride 108 (H) 98 - 107 mmol/L  
 CO2 28 21 - 32 mmol/L Anion gap 4 (L) 7 - 16 mmol/L Glucose 121 (H) 65 - 100 mg/dL BUN 14 8 - 23 MG/DL Creatinine 0.98 0.8 - 1.5 MG/DL  
 GFR est AA >60 >60 ml/min/1.73m2 GFR est non-AA >60 >60 ml/min/1.73m2 Calcium 8.7 8.3 - 10.4 MG/DL  
GLUCOSE, POC Collection Time: 02/18/19  7:00 AM  
Result Value Ref Range Glucose (POC) 118 (H) 65 - 100 mg/dL Assessment:  
 
Principal Problem: 
  Cerebral infarction due to occlusion of right middle cerebral artery ([facility] [address] (2/14/2019) Active Problems: 
  Essential hypertension (2/14/2019) Nontraumatic subcortical hemorrhage of right cerebral hemisphere ([facility] [address] (2/15/2019) [facility] (2/15/2019) Plan:  
 
Recommendations: Continue Acute Rehab Program 
Coordination of rehab/medical care Counseling of PM & R care issues management Monitoring and management of medical conditions per plan of care/orders Discussion with Family/Caregiver/Staff Reviewed Therapies/Labs/Medications/Records Signed By:  Ariella Salazar MD   
 February 18, 2019

## 2019-02-18 NOTE — PROGRESS NOTES
Referred patient to pt relations for meal tickets Roberto Hare, staff [address], [id number] [facility]  /   [email or web address]

## 2019-02-18 NOTE — PROGRESS NOTES
Problem: Mobility Impaired (Adult and Pediatric) Goal: *Acute Goals and Plan of Care (Insert Text) STG: 
(1.)Mr. Tanika Duran will move from supine to sit and sit to supine , scoot up and down and roll side to side with STAND BY ASSIST within 3 treatment day(s). (2.)Mr. Tanika Duran will transfer from bed to chair and chair to bed with STAND BY ASSIST using the least restrictive device within 3 treatment day(s). (3.)Mr. Tanika Duran will ambulate with CONTACT GUARD ASSIST for 50 feet with the least restrictive device within 3 treatment day(s). (4.)Mr. Tanika Duran will perform standing static and dynamic balance activities x 15 minutes with CONTACT GUARD ASSIST to improve safety within 3 day(s). (5.)Mr. Tanika Duran will perform LE exercises with 1 to 2 cues for form within 3 days to improve strength for functional transfers and ambulation. LTG: 
(1.)Mr. Tanika Duran will move from supine to sit and sit to supine , scoot up and down and roll side to side in bed with SUPERVISION within 7 treatment day(s). (2.)Mr. Tanika Duran will transfer from bed to chair and chair to bed with STAND BY ASSIST using the least restrictive device within 7 treatment day(s). (3.)Mr. Tanika Duran will ambulate with STAND BY ASSIST for 100 feet with the least restrictive device within 7 treatment day(s). (4.)Mr. Tanika Duran will perform standing static and dynamic balance activities x 15 minutes with STAND BY ASSIST to improve safety within 7 day(s). ________________________________________________________________________________________________ PHYSICAL THERAPY: Daily Note and PM 2/18/2019INPATIENT: PT Visit Days : 1 Payor: [facility] MEDICARE / Plan: [employer] / Product Type: Managed Care Medicare /   
  
NAME/AGE/GENDER: Tova Bowles is a 76 y.o. male PRIMARY DIAGNOSIS: Acute right arterial ischemic stroke, MCA (middle cerebral artery) ([facility] [I63.511] Cerebral infarction due to occlusion of right middle cerebral artery (HCC) Cerebral infarction due to occlusion of right middle cerebral artery ([facility] ICD-10: Treatment Diagnosis: · Difficulty in walking, Not elsewhere classified (R26.2) Precaution/Allergies: 
Niacin ASSESSMENT:  
Mr. Jamal Bermeo is a 76 y.o. male admitted for acute R arterial ischemic MCA CVA and s/p mechanical thrombectomy. He lives alone (or with a friend per niece). He typically ambulates and performs ADLs independently to modified independently with a cane. Patient is supine in the bed upon arrival with wife present. Patient is agreeable to therapy but seems to be preoccupied with his wallet and money he even takes a phone call during the treatment session. Bed mobility is with supervision. Sitting balance good. Sit to stand with contact guard assist.  Gait training with rolling walker x 250 feet with a brief standing break at the nurses station. Jaye slow but steady and gait pattern is good. Patient is returned to the room and requested to sit edge of bed. Wife to stay in the room with the patient. Good session. Patient has good potential.  Would need to limit his distractions during therapy time. He is well below his baseline at this time and with poor insight into his deficits. Clifton Jason is currently functioning below his baseline and would benefit from skilled PT during acute care stay to maximize safety and independence with functional mobility. Additional inpatient therapy would be beneficial to the patient at discharge to allow the patient to return to prior level of function. Will continue PT efforts. This section established at most recent assessment PROBLEM LIST (Impairments causing functional limitations): 1. Decreased Strength 2. Decreased ADL/Functional Activities 3. Decreased Transfer Abilities 4. Decreased Ambulation Ability/Technique 5. Decreased Balance 6. Decreased Activity Tolerance 7. Decreased Pacing Skills 8. Increased Shortness of Breath 9. Decreased Knowledge of Precautions 10. Decreased [place] with Home Exercise Program 
11. Decreased Cognition INTERVENTIONS PLANNED: (Benefits and precautions of physical therapy have been discussed with the patient.) 1. Balance Exercise 2. Bed Mobility 3. Family Education 4. Gait Training 5. Group Therapy 6. Home Exercise Program (HEP) 7. Therapeutic Activites 8. Therapeutic Exercise/Strengthening 9. Transfer Training 10. Patient Education TREATMENT PLAN: Frequency/Duration: 3 times a week for duration of hospital stay Rehabilitation Potential For Stated Goals: Good RECOMMENDED REHABILITATION/EQUIPMENT: (at time of discharge pending progress): Due to the probability of continued deficits (see above) this patient will likely need continued skilled physical therapy after discharge. Equipment:  
? None at this time HISTORY:  
History of Present Injury/Illness (Reason for Referral): 
Vee Torrez is a 76 y.o. R hand dominant male who who originally presented to [place] [address] ED with acute onset of left hemiparesis and slurred speech witnessed by friend around 0 today. Pt complained of R sided headache prior to event. Pt on Xarelto for known LLE DVT per report. Pt with hx of of HTN, DM and depression and PCP is [facility] in [facility]. Pt was not candidate for tPa due to current use of Xarelto for LLE DVT. Pt had CT head at [facility], no acute infarct noted or ICH. Pt transferred to [facility] for further evaluation for Endovascular intervention. Pt had STAT CTA/CTP of head and neck upon arrival and found to have R MCA occlusion and was taken emergently for SUZIE with Dr. Waqar Bueno. Pt airway patent, he would follow commands, he had obvious left facial droop, LUE drift, and R gaze preference. NIHSS of 7. Past Medical History/Comorbidities:  
Mr. Gregorio Gomez  has no past medical history on file.   Mr. Gregorio Gomez  has a past [facility] that [facility]). Social History/Living Environment:  
Home Environment: Private residence One/Two Story Residence: One story Living Alone: Yes Support Systems: Mandaen / micky community, Family member(s), Friends \ neighbors Patient Expects to be Discharged to[de-identified] Private residence Current DME Used/Available at Home: Cane, straight Tub or Shower Type: Tub/Shower combination Prior Level of Function/Work/Activity: 
He lives alone (or with a friend per niece). He typically ambulates and performs ADLs independently to modified independently with a cane. Number of Personal Factors/Comorbidities that affect the Plan of Care: 3+: HIGH COMPLEXITY EXAMINATION:  
Most Recent Physical Functioning:  
Gross Assessment: 
  
         
  
Posture: 
  
Balance: 
Sitting: Intact Sitting - Static: Good (unsupported) Sitting - Dynamic: Good (unsupported) Standing: Impaired Standing - Static: Good Standing - Dynamic : Fair Bed Mobility: 
Rolling: Supervision Supine to Sit: Supervision Scooting: Supervision Wheelchair Mobility: 
  
Transfers: 
Sit to Stand: Contact guard assistance Stand to Sit: Contact guard assistance Gait: 
  
Speed/Jaye: Slow Gait Abnormalities: Decreased step clearance Distance (ft): 250 Feet (ft) Assistive Device: Walker, rolling Ambulation - Level of Assistance: Contact guard assistance Interventions: Safety awareness training Body Structures Involved: 1. Nerves 2. Muscles Body Functions Affected: 1. Sensory/Pain 2. Neuromusculoskeletal 
3. Movement Related Activities and Participation Affected: 1. Mobility 2. Self Care 3. Domestic Life 4. Interpersonal Interactions and Relationships 5. Community, Social and [place] Number of elements that affect the Plan of Care: 4+: HIGH COMPLEXITY CLINICAL PRESENTATION:  
Presentation: Stable and uncomplicated: LOW COMPLEXITY CLINICAL DECISION MAKING:  
 [facility] Basic Mobility Inpatient Short Form How much difficulty does the patient currently have. .. Unable A Lot A Little None 1. Turning over in bed (including adjusting bedclothes, sheets and blankets)? [] 1   [] 2   [x] 3   [] 4  
2. Sitting down on and standing up from a chair with arms ( e.g., wheelchair, bedside commode, etc.)   [] 1   [] 2   [x] 3   [] 4  
3. Moving from lying on back to sitting on the side of the bed? [] 1   [] 2   [x] 3   [] 4 How much help from another person does the patient currently need. .. Total A Lot A Little None 4. Moving to and from a bed to a chair (including a wheelchair)? [] 1   [] 2   [x] 3   [] 4  
5. Need to walk in hospital room? [] 1   [x] 2   [] 3   [] 4  
6. Climbing 3-5 steps with a railing? [x] 1   [] 2   [] 3   [] 4  
© 2007, Trustees of [facility] [employer], under license to [employer]. All rights reserved Score:  Initial: 15 Most Recent: X (Date: -- ) Interpretation of Tool:  Represents activities that are increasingly more difficult (i.e. Bed mobility, Transfers, Gait). Medical Necessity:    
· Patient demonstrates good rehab potential due to higher previous functional level. Reason for Services/Other Comments: 
· Patient continues to require modification of therapeutic interventions to increase complexity of exercises. Use of outcome tool(s) and clinical judgement create a POC that gives a: Clear prediction of patient's progress: LOW COMPLEXITY  
  
 
 
 
TREATMENT:  
(In addition to Assessment/Re-Assessment sessions the following treatments were rendered) Pre-treatment Symptoms/Complaints:  \"OK\" Pain: Initial:  
Pain Intensity 1: 0  Post Session:  0/10 Therapeutic Activity: (    26 minutes): Therapeutic activities including bed mobility, sitting/standing balance and gait training  to improve mobility, strength, balance and coordination.   Required contact guard assist with  Safety awareness training to promote static and dynamic balance in standing and promote coordination of bilateral, lower extremity(s). Braces/Orthotics/Lines/Etc:  
· RA Treatment/Session Assessment:   
· Response to Treatment:  Patient seems to be preoccupied with his wallet and money requires verbal cues for redirection · Interdisciplinary Collaboration:  
o Physical Therapy Assistant 
o Registered Nurse · After treatment position/precautions:  
o Bed alarm/tab alert on 
o Bed/Chair-wheels locked 
o Bed in low position 
o Call light within reach 
o RN notified 
o Family at bedside 
o sitting edge of bed · Compliance with Program/Exercises: Will assess as treatment progresses · Recommendations/Intent for next treatment session: \"Next visit will focus on advancements to more challenging activities and reduction in assistance provided\". Total Treatment Duration: PT Patient Time In/Time Out Time In: 5650 Time Out: 6261 Meeta Nguyen, PTA

## 2019-02-19 VITALS
SYSTOLIC BLOOD PRESSURE: 144 MMHG | TEMPERATURE: 97.7 F | HEART RATE: 77 BPM | WEIGHT: 120.81 LBS | RESPIRATION RATE: 18 BRPM | HEIGHT: 68 IN | DIASTOLIC BLOOD PRESSURE: 80 MMHG | OXYGEN SATURATION: 93 % | BODY MASS INDEX: 18.31 KG/M2

## 2019-02-19 LAB
ANION GAP SERPL CALC-SCNC: 7 MMOL/L (ref 7–16)
BUN SERPL-MCNC: 17 MG/DL (ref 8–23)
CALCIUM SERPL-MCNC: 8.8 MG/DL (ref 8.3–10.4)
CHLORIDE SERPL-SCNC: 109 MMOL/L (ref 98–107)
CO2 SERPL-SCNC: 25 MMOL/L (ref 21–32)
CREAT SERPL-MCNC: 0.9 MG/DL (ref 0.8–1.5)
ERYTHROCYTE [DISTWIDTH] IN BLOOD BY AUTOMATED COUNT: 14 % (ref 11.9–14.6)
GLUCOSE BLD STRIP.AUTO-MCNC: 104 MG/DL (ref 65–100)
GLUCOSE SERPL-MCNC: 115 MG/DL (ref 65–100)
HCT VFR BLD AUTO: 46.9 % (ref 41.1–50.3)
HGB BLD-MCNC: 15.4 G/DL (ref 13.6–17.2)
MAGNESIUM SERPL-MCNC: 2 MG/DL (ref 1.8–2.4)
MCH RBC QN AUTO: 27.3 PG (ref 26.1–32.9)
MCHC RBC AUTO-ENTMCNC: 32.8 G/DL (ref 31.4–35)
MCV RBC AUTO: 83.2 FL (ref 79.6–97.8)
MM INDURATION POC: 0 MM (ref 0–5)
NRBC # BLD: 0 K/UL (ref 0–0.2)
PLATELET # BLD AUTO: 236 K/UL (ref 150–450)
PMV BLD AUTO: 10.9 FL (ref 9.4–12.3)
POTASSIUM SERPL-SCNC: 4 MMOL/L (ref 3.5–5.1)
PPD POC: NORMAL NEGATIVE
RBC # BLD AUTO: 5.64 M/UL (ref 4.23–5.6)
SODIUM SERPL-SCNC: 141 MMOL/L (ref 136–145)
WBC # BLD AUTO: 5.7 K/UL (ref 4.3–11.1)

## 2019-02-19 PROCEDURE — 36415 COLL VENOUS BLD VENIPUNCTURE: CPT

## 2019-02-19 PROCEDURE — 83735 ASSAY OF MAGNESIUM: CPT

## 2019-02-19 PROCEDURE — 74011250637 HC RX REV CODE- 250/637: Performed by: NURSE PRACTITIONER

## 2019-02-19 PROCEDURE — 85027 COMPLETE CBC AUTOMATED: CPT

## 2019-02-19 PROCEDURE — 99231 SBSQ HOSP IP/OBS SF/LOW 25: CPT | Performed by: PHYSICAL MEDICINE & REHABILITATION

## 2019-02-19 PROCEDURE — 80048 BASIC METABOLIC PNL TOTAL CA: CPT

## 2019-02-19 PROCEDURE — 82962 GLUCOSE BLOOD TEST: CPT

## 2019-02-19 RX ORDER — SERTRALINE HYDROCHLORIDE 100 MG/1
100 TABLET, FILM COATED ORAL EVERY EVENING
Qty: 90 TAB | Refills: 1 | Status: SHIPPED | OUTPATIENT
Start: 2019-02-19 | End: 2019-02-19

## 2019-02-19 RX ORDER — ROSUVASTATIN CALCIUM 40 MG/1
40 TABLET, COATED ORAL
Qty: 90 TAB | Refills: 1 | Status: SHIPPED | OUTPATIENT
Start: 2019-02-19 | End: 2019-02-19

## 2019-02-19 RX ORDER — LISINOPRIL AND HYDROCHLOROTHIAZIDE 12.5; 2 MG/1; MG/1
1 TABLET ORAL DAILY
Qty: 90 TAB | Refills: 1 | Status: SHIPPED | OUTPATIENT
Start: 2019-02-20 | End: 2019-02-19

## 2019-02-19 RX ORDER — LISINOPRIL AND HYDROCHLOROTHIAZIDE 12.5; 2 MG/1; MG/1
1 TABLET ORAL DAILY
Qty: 90 TAB | Refills: 1 | Status: SHIPPED | OUTPATIENT
Start: 2019-02-20

## 2019-02-19 RX ORDER — ROSUVASTATIN CALCIUM 40 MG/1
40 TABLET, COATED ORAL
Qty: 90 TAB | Refills: 1 | Status: SHIPPED | OUTPATIENT
Start: 2019-02-19

## 2019-02-19 RX ORDER — SERTRALINE HYDROCHLORIDE 100 MG/1
100 TABLET, FILM COATED ORAL EVERY EVENING
Qty: 90 TAB | Refills: 1 | Status: SHIPPED | OUTPATIENT
Start: 2019-02-19

## 2019-02-19 RX ADMIN — LISINOPRIL AND HYDROCHLOROTHIAZIDE 1 TABLET: 12.5; 2 TABLET ORAL at 08:34

## 2019-02-19 RX ADMIN — DOCUSATE SODIUM 100 MG: 100 CAPSULE, LIQUID FILLED ORAL at 08:34

## 2019-02-19 RX ADMIN — RIVAROXABAN 20 MG: 20 TABLET, FILM COATED ORAL at 08:34

## 2019-02-19 NOTE — DISCHARGE SUMMARY
Discharge Summary     Patient: Lucy Toribio MRN: [id number]  SSN: [id number]    YOB: 1950  Age: 76 y.o. Sex: male       Admit Date: 2/14/2019    Discharge Date: 2/19/2019      Admission Diagnoses: Acute right arterial ischemic stroke, MCA (middle cerebral artery) ([facility] [I63.511]    Discharge Diagnoses:   Problem List as of 2/19/2019 Never Reviewed          Codes Class Noted - Resolved    Nontraumatic subcortical hemorrhage of right cerebral hemisphere [facility]) ICD-10-CM: I61.0  ICD-9-CM: 317  2/15/2019 - Present        Paroxysmal atrial fibrillation ([facility] ICD-10-CM: I48.0  ICD-9-CM: 427.31  2/15/2019 - Present        Essential hypertension ICD-10-CM: I10  ICD-9-CM: 401.9  2/14/2019 - Present        * (Principal) Cerebral infarction due to occlusion of right middle cerebral artery ([facility] ICD-10-CM: R69.655  ICD-9-CM: 434.91  2/14/2019 - Present        Controlled type 2 diabetes mellitus without complication, with long-term current use of insulin (HCC) (Chronic) ICD-10-CM: E11.9, Z79.4  ICD-9-CM: 250.00, V58.67  2/14/2019 - Present        RESOLVED: Ischemic stroke ([facility] ICD-10-CM: I63.9  ICD-9-CM: 434.91  2/14/2019 - 2/14/2019        RESOLVED: Acute right arterial ischemic stroke, MCA (middle cerebral artery) ([facility] ICD-10-CM: D62.851  ICD-9-CM: 434.91  2/14/2019 - 2/14/2019               Discharge Condition: Good    Hospital Course: Mr. Marcia Martinez was a transfer from [address] in [place]. Mr. Marcia Martinez originally presented to [facility] ED with acute onset left hemiparesis, left facial droop and slurred speech and confusion. The pt had a CT head prior to arrival and no ICH was noted, the pt was transferred to [facility] ED for further evaluation by Dr. Candace Fung for possible mechanical thrombectomy. The pt had a CTA/CTP of head and neck and a R MCA occlusion was noted, the pt had NIHSS of 7 on admit and was a candidate for mechanical thrombectomy.  The pt was taken to IR emergently and a R MCA thrombectomy was performed by DR. Jose Manuel Tapia with TICI 3 results. The pt was admitted to ICU under Stroke and post SUZIE protocol. The pt had a 2D Echo while in the ICU with 50-55% EF, with possible thrombus inferoapical wall, discussed with Dr. Eric Lozada and Dr. Jose Manuel Tapia discussed case with Dr. Nicolas Mcneil, pt will follow up with cardiology in 4 weeks for further evaluation, no emergent findings this admit. The pt was seen by our PT/OT/ST and Rehab MD, Dr. Kizzy Vail and is deemed ready for dc home today with outpt/home health PT/OT. Case mgt has arranged for follow up PT/OT and home health in [place], [place] where pt lives. Pt and wife updated on plan of care and wants to go home today, educated about importance of follow up with his PCP, Cardiology and Dr. Jose Manuel Tapia. 1. R MCA Ischemic Stroke: pt will follow up with Dr. Jose Manuel Tapia in 8 weeks. He has been started on Xarelto 20mg po daily for his afib/stroke prevention. Pt had repeat CT head prior to dc and R temporal hemorrhage is stable and pt tolerating xarelto. Pt NIHSS upon dc is 3: mild left facial palsy, left arm weakness and mild dysarthria. Pt LDL on admit was 152 and he will remain on crestor 40mg po daily. Pt to follow up with his PCP, [facility] within next month and update them on his admit update history and medications. 2. AFIB: pt in afib during admit, was no documentation of prior afib in notes reviewed. Pt to follow up with pcp and cardiology asap post dc. He will be on xarelto 20mg daily. 3. LDL: 152 on admit: will dc on crestor 40mg po daily an follow up with PCP asap. 4. REHAB: pt will have follow up with home health and PT/OT in [place], [place]. Case mgt working with family to set up agency asap. Wife and pt updated prior to dc home today. 5. MRS on DC:  2      Consults: Cardiology and Physical Medicine and Rehabilitation    Significant Diagnostic Studies: Cerebral angiogram with mechanical thrombectomy, CTA head and neck with CT brain perfusion, daily labs, 2D echo. Disposition: home    DC time 25 minutes for med recs and dc instructions to family and pt. Talked with Case mgt prior to dc. Discharge Medications:   Current Discharge Medication List      START taking these medications    Details   lisinopril-hydroCHLOROthiazide (PRINZIDE, ZESTORETIC) 20-12.5 mg per tablet Take 1 Tab by mouth daily. Qty: 90 Tab, Refills: 1    Associated Diagnoses: Essential hypertension      rivaroxaban (XARELTO) 20 mg tab tablet Take 1 Tab by mouth daily (with breakfast). Qty: 90 Tab, Refills: 1    Associated Diagnoses: Acute right arterial ischemic stroke, MCA (middle cerebral artery) (HCC)      rosuvastatin (CRESTOR) 40 mg tablet Take 1 Tab by mouth nightly. Qty: 90 Tab, Refills: 1    Associated Diagnoses: Acute right arterial ischemic stroke, MCA (middle cerebral artery) (HCC)      sertraline (ZOLOFT) 100 mg tablet Take 1 Tab by mouth every evening. Qty: 90 Tab, Refills: 1             Activity: PT/OT per Home Health  Diet: Cardiac Diet  Wound Care: none    Follow-up Appointments   Procedures    FOLLOW UP VISIT Appointment in: Other (Specify) Pt to follow up with Dr. Rachell Quintero within next 4 weeks. Pt will also follow up with Dr. Monica jacobs in 8 weeks ( we will call pt to set up appointment date and time). Pt to follow up with Dr. Rachell Quintero within next 4 weeks. Pt will also follow up with Dr. Monica jacobs in 8 weeks ( we will call pt to set up appointment date and time). Standing Status:   Standing     Number of Occurrences:   1     Order Specific Question:   Appointment in     Answer:    Other (Specify)       Signed By: Simeon Gonzalez NP     February 19, 2019

## 2019-02-19 NOTE — PROGRESS NOTES
CM received call from patient's spouse requesting that patient be discharged to home with [facility] services rather than to [facility] when medically ready. CM met with patient's room to discuss change in discharge plan. Patient stated he would rather be at home after discharge, and confirmed request for [facility] services. CM notified NP Vidhya Mcclure of change in disposition. Referral sent by CM to [facility] services on this date. Patient to discharge to home with [facility] today. Care Management Interventions PCP Verified by CM: Karine Mcclendon in [facility]) Mode of Transport at Discharge: Other (see comment) Transition of Care Consult (CM Consult): Discharge Planning Discharge Durable Medical Equipment: (cane, glucometer) Current Support Network: Other(lives with friend, Karma Loza. Wife, Anaya Yuan, and niece , Getachew Solis -[phone]) Confirm Follow Up Transport: Self(driving self prior) Plan discussed with Pt/Family/Caregiver: Yes Freedom of Choice Offered: Yes  Resource Information Provided?: (confirms [facility] MCR- able to get rx) Discharge Location Discharge Placement: Unable to determine at this time UPDATE: Home with [address]

## 2019-02-19 NOTE — DISCHARGE INSTRUCTIONS
Stroke: After Your Visit     Your Care Instructions     You have had a stroke. Risk factors for stroke include being overweight, smoking, and sedentary lifestyle. This means that the blood flow to a part of your brain was blocked for some time, which damages the nerve cells in that part of the brain. The part of your body controlled by that part of your brain may not function properly now. The brain is an amazing organ that can heal itself to some degree. The stroke you had damaged part of your brain, but other parts of your brain may take over in some way for the damaged areas. You have already started this process. Going home may be hard for you and your family. The more you can try to do for yourself, the better. Remember to take each day one at a time. Follow-up care is a key part of your treatment and safety. Be sure to make and go to all appointments, and call your doctor if you are having problems. Its also a good idea to know your test results and keep a list of the medicines you take. How can you care for yourself at home? Enter a stroke rehabilitation (rehab) program, if your doctor recommends it. Physical, speech, and occupational therapies can help you manage bathing, dressing, eating, and other basics of daily living. Eat a heart-healthy diet that is low in cholesterol, saturated fat, and salt. Eat lots of fresh fruits and vegetables and foods high in fiber. Increase your activities slowly. Take short rest breaks when you get tired. Gradually increase the amount you walk. Start out by walking a little more than you did the day before. Do not drive until your doctor says it is okay. It is normal to feel sad or depressed after a stroke. If the blues last, talk to your doctor. If you are having problems with urine leakage, go to the bathroom at regular times, including when you first wake up and at bedtime. Also, limit fluids after dinner.   If you are constipated, drink plenty of fluids, enough so that your urine is light yellow or clear like water. If you have kidney, heart, or liver disease and have to limit fluids, talk with your doctor before you increase the amount of fluids you drink. Set up a regular time for using the toilet. If you continue to have constipation, your doctor may suggest using a bulking agent, such as Metamucil, or a stool softener, laxative, or enema. Medicines  Take your medicines exactly as prescribed. Call your doctor if you think you are having a problem with your medicine. You may be taking several medicines. ACE (angiotensin-converting enzyme) inhibitors, angiotensin II receptor blockers (ARBs), beta-blockers, diuretics (water pills), and calcium channel blockers control your blood pressure. Statins help lower cholesterol. Your doctor may also prescribe medicines for depression, pain, sleep problems, anxiety, or agitation. If your doctor has given you medicine that prevents blood clots, such as warfarin (Coumadin), aspirin combined with extended-release dipyridamole (Aggrenox), clopidogrel (Plavix), or aspirin to prevent another stroke, you should:  Tell your dentist, pharmacist, and other health professionals that you take these medicines. Watch for unusual bruising or bleeding, such as blood in your urine, red or black stools, or bleeding from your nose or gums. Get regular blood tests to check your clotting time if you are taking Coumadin. Wear medical alert jewelry that says you take blood thinners. You can buy this at most drugstores. Do not take any over-the-counter medicines or herbal products without talking to your doctor first.  If you take birth control pills or hormone replacement therapy, talk to your doctor about whether they are right for you. For family members and caregivers  Make the home safe. Set up a room so that your loved one does not have to climb stairs. Be sure the bathroom is on the same floor.  Move throw rugs and furniture that could cause falls, and make sure that the lighting is good. Put grab bars and seats in tubs and showers. Find out what your loved one can do and what he or she needs help with. Try not to do things for your loved one that your loved one can do on his or her own. Help him or her learn and practice new skills. Visit and talk with your loved one often. Try doing activities together that you both enjoy, such as playing cards or board games. Keep in touch with your loved one's friends as much as you can, and encourage them to visit. Take care of yourself. Do not try to do everything yourself. Ask other family members to help. Eat well, get enough rest, and take time to do things that you enjoy. Keep up with your own doctor visits, and make sure to take your medicines regularly. Get out of the house as much as you can. Join a local support group. Find out if you qualify for home health care visits to help with rehab or for adult day care. When should you call for help? Call 911 anytime you think you may need emergency care. For example, call if:  You have signs of another stroke. These may include:  Sudden numbness, paralysis, or weakness in your face, arm, or leg, especially on only one side of your body. New problems with walking or balance. Sudden vision changes. Drooling or slurred speech. New problems speaking or understanding simple statements, or you feel confused. A sudden, severe headache that is different from past headaches. Call 911 even if these symptoms go away in a few minutes. You cough up blood. You vomit blood or what looks like coffee grounds. You pass maroon or very bloody stools. Call your doctor now or seek immediate medical care if:  You have new bruises or blood spots under your skin. You have a nosebleed. Your gums bleed when you brush your teeth. You have blood in your urine. Your stools are black and tarlike or have streaks of blood.   You have vaginal bleeding when you are not having your period, or heavy period bleeding. You have new symptoms that may be related to your stroke, such as falls or trouble swallowing. Watch closely for changes in your health, and be sure to contact your doctor if you have any problems. Where can you learn more? Go to [employer]    Enter C294  in the search box to learn more about \"Stroke: After Your Visit\". © 9137-9458 [employer], [employer]. Care instructions adapted under license by [facility] (which disclaims liability or warranty for this information). This care instruction is for use with your licensed healthcare professional. If you have questions about a medical condition or this instruction, always ask your healthcare professional. Theo Burns any warranty or liability for your use of this information. DISCHARGE SUMMARY from Nurse    PATIENT INSTRUCTIONS:    After general anesthesia or intravenous sedation, for 24 hours or while taking prescription Narcotics:  · Limit your activities  · Do not drive and operate hazardous machinery  · Do not make important personal or business decisions  · Do  not drink alcoholic beverages  · If you have not urinated within 8 hours after discharge, please contact your surgeon on call.     Report the following to your surgeon:  · Excessive pain, swelling, redness or odor of or around the surgical area  · Temperature over 100.5  · Nausea and vomiting lasting longer than 4 hours or if unable to take medications  · Any signs of decreased circulation or nerve impairment to extremity: change in color, persistent  numbness, tingling, coldness or increase pain  · Any questions    What to do at Home:  Recommended activity: Activity as tolerated, per MD instructions    If you experience any of the following symptoms fever > 100.5, nausea, vomiting, pain to MD, increased numbness and tingling, slurred speech, cheat pain and/or shortness of breath to ER please follow up with MD.    *  Please give a list of your current medications to your Primary Care Provider. *  Please update this list whenever your medications are discontinued, doses are      changed, or new medications (including over-the-counter products) are added. *  Please carry medication information at all times in case of emergency situations. These are general instructions for a healthy lifestyle:    No smoking/ No tobacco products/ Avoid exposure to second hand smoke  Surgeon General's Warning:  Quitting smoking now greatly reduces serious risk to your health. Obesity, smoking, and sedentary lifestyle greatly increases your risk for illness    A healthy diet, regular physical exercise & weight monitoring are important for maintaining a healthy lifestyle    You may be retaining fluid if you have a history of heart failure or if you experience any of the following symptoms:  Weight gain of 3 pounds or more overnight or 5 pounds in a week, increased swelling in our hands or feet or shortness of breath while lying flat in bed. Please call your doctor as soon as you notice any of these symptoms; do not wait until your next office visit. Recognize signs and symptoms of STROKE:    F-face looks uneven    A-arms unable to move or move unevenly    S-speech slurred or non-existent    T-time-call 911 as soon as signs and symptoms begin-DO NOT go       Back to bed or wait to see if you get better-TIME IS BRAIN. Warning Signs of HEART ATTACK     Call 911 if you have these symptoms:   Chest discomfort. Most heart attacks involve discomfort in the center of the chest that lasts more than a few minutes, or that goes away and comes back. It can feel like uncomfortable pressure, squeezing, fullness, or pain.  Discomfort in other areas of the upper body. Symptoms can include pain or discomfort in one or both arms, the back, neck, jaw, or stomach.    Shortness of breath with or without chest discomfort.  Other signs may include breaking out in a cold sweat, nausea, or lightheadedness. Don't wait more than five minutes to call 911 - MINUTES MATTER! Fast action can save your life. Calling 911 is almost always the fastest way to get lifesaving treatment. Emergency Medical Services staff can begin treatment when they arrive -- up to an hour sooner than if someone gets to the hospital by car. The discharge information has been reviewed with the patient. The patient verbalized understanding. Discharge medications reviewed with the patient and appropriate educational materials and side effects teaching were provided.   ___________________________________________________________________________________________________________________________________

## 2019-02-19 NOTE — PROGRESS NOTES
PM&R Consult Progress Note Patient: Ronald Sosa Admit Date: 2/14/2019 Admit Diagnosis: Acute right arterial ischemic stroke, MCA (middle cerebral artery) ([facility] [address] [I63.511] Recommendations: Continue Acute Rehab Program, Coordination of rehab/medical care, Counseling of PM & R care issues management, Home/Outpatient Rehab 
- agree with HH; pt did very well with PT; was supervision/CGA with mobility and ambulated 250ft with RW CGA. Min assist with ADLs. Recommend HH Outpt PT and OT vs HH. He does have executive cognitive deficits but wife feels he is at baseline. IRC not indicated at this time; pt is too functional and should continue to improve. History/Subjective/Complaint:  
 
Patient seen and examined. Records reviewed. Wants to go home Pain 1 Pain Scale 1: Numeric (0 - 10) (02/18/19 1500) Pain Intensity 1: 0 (02/18/19 1500) Patient Stated Pain Goal: 0 (02/16/19 0804) Pain Reassessment 1: Patient sleeping (02/15/19 0011) Pain Location 1: Head (02/14/19 2311) Pain Intervention(s) 1: Medication (see MAR) (02/14/19 2311) Objective:  
 
Vitals: 
Patient Vitals for the past 8 hrs: 
 BP Temp Pulse Resp SpO2  
02/19/19 0803 144/80 97.7 °F (36.5 °C) 77 18 93 % 02/19/19 0400 130/89 97.9 °F (36.6 °C) 69 18 93 % Intake and Output: 
02/17 1901 - 02/19 0700 In: 5 [P.O.:420] Out: - Allergies Allergen Reactions  Niacin Unknown (comments) Current Facility-Administered Medications Medication Dose Route Frequency  rivaroxaban (XARELTO) tablet 20 mg  20 mg Oral DAILY WITH BREAKFAST  insulin lispro (HUMALOG) injection   SubCUTAneous AC&HS  
 docusate sodium (COLACE) capsule 100 mg  100 mg Oral BID  
 bisacodyl (DULCOLAX) tablet 5 mg  5 mg Oral DAILY PRN  
 HYDROcodone-acetaminophen (NORCO) 7.5-325 mg per tablet 1 Tab  1 Tab Oral Q4H PRN  
 ondansetron (ZOFRAN) injection 4 mg  4 mg IntraVENous Q4H PRN  
 lisinopril-hydroCHLOROthiazide (PRINZIDE, ZESTORETIC) 20-12.5 mg per tablet 1 Tab  1 Tab Oral DAILY  rosuvastatin (CRESTOR) tablet 40 mg  40 mg Oral QHS  sertraline (ZOLOFT) tablet 100 mg  100 mg Oral QPM  
 
 
Physical Exam: No significant changes Incision(s)/Wound(s):  
     
 
Functional Assessment: 
Gross Assessment AROM: Generally decreased, functional (02/15/19 1400) PROM: Within functional limits (02/15/19 1400) Strength: Generally decreased, functional (02/15/19 1400) Coordination: Generally decreased, functional (02/15/19 1400) Tone: Normal (02/15/19 1400) Sensation: Intact (02/15/19 1400) Gait Base of Support: Center of gravity altered; Widened (02/15/19 1400) Speed/Jaye: Slow (02/18/19 1500) Gait Abnormalities: Decreased step clearance (02/18/19 1500) Ambulation - Level of Assistance: Contact guard assistance (02/18/19 1500) Distance (ft): 250 Feet (ft) (02/18/19 1500) Assistive Device: Walker, rolling (02/18/19 1500) Interventions: Safety awareness training (02/18/19 1500) Bed Mobility Rolling: Supervision (02/18/19 1500) Supine to Sit: Supervision (02/18/19 1500) Sit to Supine: Contact guard assistance (02/15/19 1400) Scooting: Supervision (02/18/19 1500) Balance Sitting: Intact (02/18/19 1500) Sitting - Static: Good (unsupported) (02/18/19 1500) Sitting - Dynamic: Good (unsupported) (02/18/19 1500) Standing: Impaired (02/18/19 1500) Standing - Static: Good (02/18/19 1500) Standing - Dynamic : Fair (02/18/19 1500) Grooming Grooming Assistance: Contact guard assistance (02/18/19 1100) Washing Face: Supervision/set-up (02/15/19 1100) Bed/Mat Mobility Rolling: Supervision (02/18/19 1500) Supine to Sit: Supervision (02/18/19 1500) Sit to Supine: Contact guard assistance (02/15/19 1400) Sit to Stand: Contact guard assistance (02/18/19 1500) Bed to Chair: Contact guard assistance (02/18/19 1100) Scooting: Supervision (02/18/19 1500) Labs/Studies: 
Recent Results (from the past 72 hour(s)) GLUCOSE, POC Collection Time: 02/16/19 12:17 PM  
Result Value Ref Range Glucose (POC) 95 65 - 100 mg/dL GLUCOSE, POC Collection Time: 02/16/19  9:06 PM  
Result Value Ref Range Glucose (POC) 125 (H) 65 - 100 mg/dL PTT Collection Time: 02/17/19  5:01 AM  
Result Value Ref Range aPTT 27.4 24.7 - 39.8 SEC MAGNESIUM Collection Time: 02/17/19  5:01 AM  
Result Value Ref Range Magnesium 1.9 1.8 - 2.4 mg/dL CBC W/O DIFF Collection Time: 02/17/19  5:01 AM  
Result Value Ref Range WBC 5.3 4.3 - 11.1 K/uL  
 RBC 5.51 4.23 - 5.6 M/uL  
 HGB 14.8 13.6 - 17.2 g/dL HCT 46.1 41.1 - 50.3 % MCV 83.7 79.6 - 97.8 FL  
 MCH 26.9 26.1 - 32.9 PG  
 MCHC 32.1 31.4 - 35.0 g/dL  
 RDW 13.8 11.9 - 14.6 % PLATELET 317 330 - 905 K/uL MPV 10.7 9.4 - 12.3 FL ABSOLUTE NRBC 0.00 0.0 - 0.2 K/uL METABOLIC PANEL, BASIC Collection Time: 02/17/19  5:01 AM  
Result Value Ref Range Sodium 140 136 - 145 mmol/L Potassium 3.9 3.5 - 5.1 mmol/L Chloride 108 (H) 98 - 107 mmol/L  
 CO2 25 21 - 32 mmol/L Anion gap 7 7 - 16 mmol/L Glucose 119 (H) 65 - 100 mg/dL BUN 11 8 - 23 MG/DL Creatinine 0.77 (L) 0.8 - 1.5 MG/DL  
 GFR est AA >60 >60 ml/min/1.73m2 GFR est non-AA >60 >60 ml/min/1.73m2 Calcium 8.5 8.3 - 10.4 MG/DL  
GLUCOSE, POC Collection Time: 02/17/19  7:22 AM  
Result Value Ref Range Glucose (POC) 102 (H) 65 - 100 mg/dL GLUCOSE, POC Collection Time: 02/17/19 11:42 AM  
Result Value Ref Range Glucose (POC) 156 (H) 65 - 100 mg/dL GLUCOSE, POC Collection Time: 02/17/19  2:30 PM  
Result Value Ref Range Glucose (POC) 100 65 - 100 mg/dL GLUCOSE, POC Collection Time: 02/17/19  8:50 PM  
Result Value Ref Range Glucose (POC) 102 (H) 65 - 100 mg/dL GLUCOSE, POC Collection Time: 02/18/19 12:16 AM  
Result Value Ref Range Glucose (POC) 108 (H) 65 - 100 mg/dL PTT Collection Time: 02/18/19  4:48 AM  
Result Value Ref Range aPTT 29.7 24.7 - 39.8 SEC MAGNESIUM Collection Time: 02/18/19  4:48 AM  
Result Value Ref Range Magnesium 2.1 1.8 - 2.4 mg/dL CBC W/O DIFF Collection Time: 02/18/19  4:48 AM  
Result Value Ref Range WBC 5.3 4.3 - 11.1 K/uL  
 RBC 5.65 (H) 4.23 - 5.6 M/uL  
 HGB 15.2 13.6 - 17.2 g/dL HCT 48.1 41.1 - 50.3 % MCV 85.1 79.6 - 97.8 FL  
 MCH 26.9 26.1 - 32.9 PG  
 MCHC 31.6 31.4 - 35.0 g/dL  
 RDW 14.0 11.9 - 14.6 % PLATELET 101 460 - 409 K/uL MPV 10.7 9.4 - 12.3 FL ABSOLUTE NRBC 0.00 0.0 - 0.2 K/uL METABOLIC PANEL, BASIC Collection Time: 02/18/19  4:48 AM  
Result Value Ref Range Sodium 140 136 - 145 mmol/L Potassium 4.6 3.5 - 5.1 mmol/L Chloride 108 (H) 98 - 107 mmol/L  
 CO2 28 21 - 32 mmol/L Anion gap 4 (L) 7 - 16 mmol/L Glucose 121 (H) 65 - 100 mg/dL BUN 14 8 - 23 MG/DL Creatinine 0.98 0.8 - 1.5 MG/DL  
 GFR est AA >60 >60 ml/min/1.73m2 GFR est non-AA >60 >60 ml/min/1.73m2 Calcium 8.7 8.3 - 10.4 MG/DL  
GLUCOSE, POC Collection Time: 02/18/19  7:00 AM  
Result Value Ref Range Glucose (POC) 118 (H) 65 - 100 mg/dL GLUCOSE, POC Collection Time: 02/18/19 10:52 AM  
Result Value Ref Range Glucose (POC) 134 (H) 65 - 100 mg/dL GLUCOSE, POC Collection Time: 02/18/19  4:14 PM  
Result Value Ref Range Glucose (POC) 116 (H) 65 - 100 mg/dL GLUCOSE, POC Collection Time: 02/18/19  8:40 PM  
Result Value Ref Range Glucose (POC) 146 (H) 65 - 100 mg/dL MAGNESIUM Collection Time: 02/19/19  5:07 AM  
Result Value Ref Range Magnesium 2.0 1.8 - 2.4 mg/dL CBC W/O DIFF Collection Time: 02/19/19  5:07 AM  
Result Value Ref Range WBC 5.7 4.3 - 11.1 K/uL  
 RBC 5.64 (H) 4.23 - 5.6 M/uL  
 HGB 15.4 13.6 - 17.2 g/dL HCT 46.9 41.1 - 50.3 % MCV 83.2 79.6 - 97.8 FL  
 MCH 27.3 26.1 - 32.9 PG  
 MCHC 32.8 31.4 - 35.0 g/dL  
 RDW 14.0 11.9 - 14.6 % PLATELET 070 603 - 034 K/uL  MPV 10.9 9.4 - 12.3 FL  
 ABSOLUTE NRBC 0.00 0.0 - 0.2 K/uL METABOLIC PANEL, BASIC Collection Time: 02/19/19  5:07 AM  
Result Value Ref Range Sodium 141 136 - 145 mmol/L Potassium 4.0 3.5 - 5.1 mmol/L Chloride 109 (H) 98 - 107 mmol/L  
 CO2 25 21 - 32 mmol/L Anion gap 7 7 - 16 mmol/L Glucose 115 (H) 65 - 100 mg/dL BUN 17 8 - 23 MG/DL Creatinine 0.90 0.8 - 1.5 MG/DL  
 GFR est AA >60 >60 ml/min/1.73m2 GFR est non-AA >60 >60 ml/min/1.73m2 Calcium 8.8 8.3 - 10.4 MG/DL  
GLUCOSE, POC Collection Time: 02/19/19  7:30 AM  
Result Value Ref Range Glucose (POC) 104 (H) 65 - 100 mg/dL PLEASE READ & DOCUMENT PPD TEST IN 24 HRS Collection Time: 02/19/19  8:26 AM  
Result Value Ref Range PPD neg Negative  
 mm Induration 0 mm Assessment:  
 
Principal Problem: 
  Cerebral infarction due to occlusion of right middle cerebral artery ([facility] [address] (2/14/2019) Active Problems: 
  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  
 
Recommendations: Continue Acute Rehab Program 
Coordination of rehab/medical care Counseling of PM & R care issues management Monitoring and management of medical conditions per plan of care/orders Discussion with Family/Caregiver/Staff Reviewed Therapies/Labs/Medications/Records Signed By:  Amie Foster MD   
 February 19, 2019

## 2019-02-19 NOTE — PROGRESS NOTES
CM received a call from [facility] [address] that their company does not service [facility]. CM sent [facility] referral for PT/OT/ST to [facility] , and notified Alisia Uribe, [address] , of the referral. Referral sent by fax to [phone]. Patient discharging today.